# Patient Record
Sex: MALE | Race: WHITE | NOT HISPANIC OR LATINO | Employment: FULL TIME | URBAN - METROPOLITAN AREA
[De-identification: names, ages, dates, MRNs, and addresses within clinical notes are randomized per-mention and may not be internally consistent; named-entity substitution may affect disease eponyms.]

---

## 2019-11-22 ENCOUNTER — APPOINTMENT (OUTPATIENT)
Dept: RADIOLOGY | Facility: HOSPITAL | Age: 44
End: 2019-11-22
Payer: COMMERCIAL

## 2019-11-22 ENCOUNTER — TRANSCRIBE ORDERS (OUTPATIENT)
Dept: URGENT CARE | Facility: HOSPITAL | Age: 44
End: 2019-11-22

## 2019-11-22 DIAGNOSIS — M25.561 RIGHT KNEE PAIN, UNSPECIFIED CHRONICITY: Primary | ICD-10-CM

## 2019-11-22 DIAGNOSIS — M25.562 LEFT KNEE PAIN, UNSPECIFIED CHRONICITY: ICD-10-CM

## 2019-11-22 DIAGNOSIS — M25.561 RIGHT KNEE PAIN, UNSPECIFIED CHRONICITY: ICD-10-CM

## 2019-11-22 PROCEDURE — 73562 X-RAY EXAM OF KNEE 3: CPT

## 2020-01-07 ENCOUNTER — EVALUATION (OUTPATIENT)
Dept: PHYSICAL THERAPY | Facility: CLINIC | Age: 45
End: 2020-01-07
Payer: COMMERCIAL

## 2020-01-07 DIAGNOSIS — M22.2X9 DISORDER OF PATELLOFEMORAL JOINT, UNSPECIFIED LATERALITY: Primary | ICD-10-CM

## 2020-01-07 DIAGNOSIS — M25.561 CHRONIC PAIN OF RIGHT KNEE: ICD-10-CM

## 2020-01-07 DIAGNOSIS — M25.562 CHRONIC PAIN OF LEFT KNEE: ICD-10-CM

## 2020-01-07 DIAGNOSIS — G89.29 CHRONIC PAIN OF RIGHT KNEE: ICD-10-CM

## 2020-01-07 DIAGNOSIS — G89.29 CHRONIC PAIN OF LEFT KNEE: ICD-10-CM

## 2020-01-07 PROCEDURE — 97161 PT EVAL LOW COMPLEX 20 MIN: CPT | Performed by: PHYSICAL THERAPIST

## 2020-01-07 PROCEDURE — 97110 THERAPEUTIC EXERCISES: CPT | Performed by: PHYSICAL THERAPIST

## 2020-01-07 NOTE — LETTER
2020    Deana Londono DO  [address]  [address] [id number] [facility] [address] [facility]    Patient: Roni Sweeney   YOB: 1975   Date of Visit: 2020     Encounter Diagnosis     ICD-10-CM    1  Disorder of patellofemoral joint, unspecified laterality M22 2X9    2  Chronic pain of right knee M25 561     G89 29    3  Chronic pain of left knee M25 562     G89 29        Dear Dr Anupam Jay: Thank you for your recent referral of Roni Sweeney  Please review the attached evaluation summary from [facility] recent visit  Please verify that you agree with the plan of care by signing the attached order  If you have any questions or concerns, please do not hesitate to call  I sincerely appreciate the opportunity to share in the care of one of your patients and hope to have another opportunity to work with you in the near future  Sincerely,    Roxi Britton, PT      Referring Provider:      I certify that I have read the below Plan of Care and certify the need for these services furnished under this plan of treatment while under my care  Deanaelma Londono   [address]  [address] [facility] [place]: [phone]          PT Evaluation     Today's date: 2020  Patient name: Roni Sweeney  : 1975  MRN: [id number]  Referring provider: Nohemy Bone DO  Dx:   Encounter Diagnosis     ICD-10-CM    1  Disorder of patellofemoral joint, unspecified laterality M22 2X9    2  Chronic pain of right knee M25 561     G89 29    3  Chronic pain of left knee M25 562     G89 29        Start Time: 1300  Stop Time: 1400  Total time in clinic (min): 60 minutes    Assessment  Assessment details: Roni Sweeney was seen for an initial PT evaluation today  Patient is a 45 y o  male with diagnosis of bilateral knee pain and past medical history significant for hernias with repair, plantar fasciitis   Low complexity evaluation  due to number of participation restrictions, functional outcome measure of 50%% limitation, and stable clinical presentation  Findings today show right>left knee pain with weakness of hip extensors and abductors as well as poor patellar tracking on right with limitation in bilateral lower extremity flexibility likely contributing to pain at this time  Skilled PT indicated to treat at this time to address bilateral lower extremity strength, stability, and flexibility to decrease pain and improve overall functional mobility for return to prior level of function  Impairments: abnormal gait, activity intolerance, impaired balance, impaired physical strength, lacks appropriate home exercise program, pain with function, weight-bearing intolerance and poor body mechanics    Goals  STG (4 weeks)  1  Patient will have reported 0/10 pain in right knee at rest    2  Improve patient's right knee flexion to 132 degrees pain free for increased ability to take proper strides during ambulation  3  Increase patient's right single leg balance to 15 seconds for increased stability on stairs  LTG (8 weeks)  1  Patient's LE strength will be equal bilaterally for ability to ambulate and return to functional activities at [facility]  2  Patient will be able to walk 1 mile with 0/10 pain in right knee  3  Patient will be independent with home exercise program for continued maintenance post PT discharge         Plan  Plan details: Re-assessment in 4 weeks  Patient would benefit from: skilled physical therapy  Planned modality interventions: unattended electrical stimulation, thermotherapy: hydrocollator packs and cryotherapy  Planned therapy interventions: manual therapy, neuromuscular re-education, therapeutic activities, therapeutic exercise, home exercise program and gait training  Frequency: 2x week  Duration in weeks: 8  Plan of Care beginning date: 1/7/2020  Plan of Care expiration date: 3/7/2020  Treatment plan discussed with: PTA and patient        Subjective Evaluation    History of Present Illness  Date of onset: 9/7/2019  Mechanism of injury: Houston Anderson is a 45 y o  male who presents to outpatient Physical Therapy today with complaints of R>L knee pain  Patient states he has had chronic bilateral knee pain for years, but increased in September when he increased running mileage  Backed off of running, but had minimal relief  Saw ortho earlier today who reviewed x-rays, referred him to PT and Rx an antiinflammatory  NDV in 2 weeks  X-ray Impression: No osseus deformity bilaterally    Pain  Pain scale: R=6 L=3  Pain scale at lowest: R=3 L=0  Pain scale at highest: R=8 L=3  Location: medial aspect of bilateral knees  Quality: sharp and knife-like ("feels like a knee hernando that never goes away")  Relieving factors: rest and relaxation  Aggravating factors: running  Progression: no change    Social Support  Steps to enter house: yes  5  Stairs in house: yes   40  Lives in: multiple-level home    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  )    Diagnostic Tests  X-ray: normal  Patient Goals  Patient goal: Strengthen joint and be able to walk without pain        Objective     Observations   Left Knee   Negative for edema  Right Knee   Negative for edema  Tenderness   Left Knee   Tenderness in the medial joint line  No tenderness in the ITB, patellar tendon and tibial tubercle  Right Knee   Tenderness in the medial joint line and tibial tubercle  No tenderness in the ITB and patellar tendon  Neurological Testing     Sensation     Knee   Left Knee   Intact: light touch    Right Knee   Intact: light touch     Active Range of Motion   Left Knee   Flexion: 134 degrees   Extension: -4 degrees     Right Knee   Flexion: 130 degrees   Extension: 0 degrees     Mobility   Patellar Mobility:   Left Knee   WFL: medial, lateral, superior and inferior       Right Knee   WFL: medial, lateral, superior and inferior    Strength/Myotome Testing     Left Hip   Planes of Motion   Flexion: 5  Extension: 4  Abduction: 4    Right Hip   Planes of Motion   Flexion: 5  Extension: 4-  Abduction: 4-    Left Knee   Normal strength  Quadriceps contraction: good    Right Knee   Flexion: 4  Extension: 4  Quadriceps contraction: good    Tests     Additional Tests Details  Hamstring 90/90 SLR R= (31) L= (32)  Jsign (-) left, (+) right  R>L ITB tightness  Pain with right deep knee flexion    General Comments:      Knee Comments  Gait: Antalgic, decreased stance time on RLE, increased LLE supination with toe in  FOTO: 50% function, 68% predicted function         Flowsheet Rows      Most Recent Value   PT/OT G-Codes   Current Score  50   Projected Score  68             Precautions: none noted  Re-Evaluation:  2/7    Knee Specialty Daily Treatment Diary     Manual  1/7               Hamstring stretch        Quad stretch        ITB stretch                    Exercise Diary  1/7       Biodex Bike        bridges 10"x10       sidelying clamshell 30x       SLR all planes Hip flex with ER       SAQ Pain!        LAQ With ball squeeze 30x       Hamstring stretch 3x30" long sit       Calf stretch        Standing hamstring curls        TKE        Leg press Squat        SLB        Step up        [address] [facility]                                                    Modalities 1/7                                 Chase Adjutant was given a handout and verbal instruction of customized home exercise program  Patient accepted program and was able to demonstrate exercises

## 2020-01-07 NOTE — PROGRESS NOTES
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    2  Chronic pain of right knee M25 561     G89 29    3  Chronic pain of left knee M25 562     G89 29        Start Time: 1300  Stop Time: 1400  Total time in clinic (min): 60 minutes    Assessment  Assessment details: Houston Anderson was seen for an initial PT evaluation today  Patient is a 45 y o  male with diagnosis of bilateral knee pain and past medical history significant for hernias with repair, plantar fasciitis  Low complexity evaluation  due to number of participation restrictions, functional outcome measure of 50%% limitation, and stable clinical presentation  Findings today show right>left knee pain with weakness of hip extensors and abductors as well as poor patellar tracking on right with limitation in bilateral lower extremity flexibility likely contributing to pain at this time  Skilled PT indicated to treat at this time to address bilateral lower extremity strength, stability, and flexibility to decrease pain and improve overall functional mobility for return to prior level of function  Impairments: abnormal gait, activity intolerance, impaired balance, impaired physical strength, lacks appropriate home exercise program, pain with function, weight-bearing intolerance and poor body mechanics    Goals  STG (4 weeks)  1  Patient will have reported 0/10 pain in right knee at rest    2  Improve patient's right knee flexion to 132 degrees pain free for increased ability to take proper strides during ambulation  3  Increase patient's right single leg balance to 15 seconds for increased stability on stairs  LTG (8 weeks)  1  Patient's LE strength will be equal bilaterally for ability to ambulate and return to functional activities at [facility]     2  Patient will be able to walk 1 mile with 0/10 pain in right knee  3  Patient will be independent with home exercise program for continued maintenance post PT discharge  Plan  Plan details: Re-assessment in 4 weeks  Patient would benefit from: skilled physical therapy  Planned modality interventions: unattended electrical stimulation, thermotherapy: hydrocollator packs and cryotherapy  Planned therapy interventions: manual therapy, neuromuscular re-education, therapeutic activities, therapeutic exercise, home exercise program and gait training  Frequency: 2x week  Duration in weeks: 8  Plan of Care beginning date: 1/7/2020  Plan of Care expiration date: 3/7/2020  Treatment plan discussed with: PTA and patient        Subjective Evaluation    History of Present Illness  Date of onset: 9/7/2019  Mechanism of injury: Dahlia Astudillo is a 45 y o  male who presents to outpatient Physical Therapy today with complaints of R>L knee pain  Patient states he has had chronic bilateral knee pain for years, but increased in September when he increased running mileage  Backed off of running, but had minimal relief  Saw ortho earlier today who reviewed x-rays, referred him to PT and Rx an antiinflammatory  NDV in 2 weeks  X-ray Impression: No osseus deformity bilaterally    Pain  Pain scale: R=6 L=3  Pain scale at lowest: R=3 L=0  Pain scale at highest: R=8 L=3    Location: medial aspect of bilateral knees  Quality: sharp and knife-like ("feels like a knee hernando that never goes away")  Relieving factors: rest and relaxation  Aggravating factors: running  Progression: no change    Social Support  Steps to enter house: yes  5  Stairs in house: yes   40  Lives in: multiple-level home    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  )    Diagnostic Tests  X-ray: normal  Patient Goals  Patient goal: Strengthen joint and be able to walk without pain        Objective     Observations   Left Knee Negative for edema  Right Knee   Negative for edema  Tenderness   Left Knee   Tenderness in the medial joint line  No tenderness in the ITB, patellar tendon and tibial tubercle  Right Knee   Tenderness in the medial joint line and tibial tubercle  No tenderness in the ITB and patellar tendon  Neurological Testing     Sensation     Knee   Left Knee   Intact: light touch    Right Knee   Intact: light touch     Active Range of Motion   Left Knee   Flexion: 134 degrees   Extension: -4 degrees     Right Knee   Flexion: 130 degrees   Extension: 0 degrees     Mobility   Patellar Mobility:   Left Knee   WFL: medial, lateral, superior and inferior  Right Knee   WFL: medial, lateral, superior and inferior    Strength/Myotome Testing     Left Hip   Planes of Motion   Flexion: 5  Extension: 4  Abduction: 4    Right Hip   Planes of Motion   Flexion: 5  Extension: 4-  Abduction: 4-    Left Knee   Normal strength  Quadriceps contraction: good    Right Knee   Flexion: 4  Extension: 4  Quadriceps contraction: good    Tests     Additional Tests Details  Hamstring 90/90 SLR R= (31) L= (32)  Jsign (-) left, (+) right  R>L ITB tightness  Pain with right deep knee flexion    General Comments:      Knee Comments  Gait: Antalgic, decreased stance time on RLE, increased LLE supination with toe in  FOTO: 50% function, 68% predicted function         Flowsheet Rows      Most Recent Value   PT/OT G-Codes   Current Score  50   Projected Score  68             Precautions: none noted  Re-Evaluation:  2/7    Knee Specialty Daily Treatment Diary     Manual  1/7               Hamstring stretch        Quad stretch        ITB stretch                    Exercise Diary  1/7       Biodex Bike        bridges 10"x10       sidelying clamshell 30x       SLR all planes Hip flex with ER       SAQ Pain!        LAQ With ball squeeze 30x       Hamstring stretch 3x30" long sit       Calf stretch        Standing hamstring curls        TKE Leg press Squat        SLB        Step up        [facility]                                                    Modalities 1/7                                 Daphne Brizuela was given a handout and verbal instruction of customized home exercise program  Patient accepted program and was able to demonstrate exercises

## 2020-01-08 ENCOUNTER — TRANSCRIBE ORDERS (OUTPATIENT)
Dept: PHYSICAL THERAPY | Facility: CLINIC | Age: 45
End: 2020-01-08

## 2020-01-08 DIAGNOSIS — M22.2X9 DISORDER OF PATELLOFEMORAL JOINT, UNSPECIFIED LATERALITY: Primary | ICD-10-CM

## 2020-01-09 ENCOUNTER — OFFICE VISIT (OUTPATIENT)
Dept: PHYSICAL THERAPY | Facility: CLINIC | Age: 45
End: 2020-01-09
Payer: COMMERCIAL

## 2020-01-09 DIAGNOSIS — G89.29 CHRONIC PAIN OF RIGHT KNEE: ICD-10-CM

## 2020-01-09 DIAGNOSIS — G89.29 CHRONIC PAIN OF LEFT KNEE: ICD-10-CM

## 2020-01-09 DIAGNOSIS — M25.562 CHRONIC PAIN OF LEFT KNEE: ICD-10-CM

## 2020-01-09 DIAGNOSIS — M25.561 CHRONIC PAIN OF RIGHT KNEE: ICD-10-CM

## 2020-01-09 DIAGNOSIS — M22.2X9 DISORDER OF PATELLOFEMORAL JOINT, UNSPECIFIED LATERALITY: Primary | ICD-10-CM

## 2020-01-09 PROCEDURE — 97530 THERAPEUTIC ACTIVITIES: CPT | Performed by: PHYSICAL THERAPIST

## 2020-01-09 PROCEDURE — 97110 THERAPEUTIC EXERCISES: CPT | Performed by: PHYSICAL THERAPIST

## 2020-01-09 NOTE — PROGRESS NOTES
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    2  Chronic pain of right knee M25 561     G89 29    3  Chronic pain of left knee M25 562     G89 29        Start Time: 1700  Stop Time: 1755  Total time in clinic (min): 55 minutes    Subjective: Patient states his anti-inflammatory medication has been helping a lot  Not much pain in knees today  Objective: See treatment diary below      Assessment: Tolerated treatment well  Tends to overuse hip flexors to compensate for weakness in hip abductors  Noted tightness R>L ITB with tenderness during manual roller  Patient would benefit from continued PT      Plan: Continue per plan of care  Progress treatment as tolerated  Precautions: none noted; R>L knee pain  Re-Evaluation:      Knee Specialty Daily Treatment Diary     Manual                Hamstring stretch        Quad stretch        ITB stretch        ITB roller  Bilateral           Exercise Diary        Biodex Bike S=9  100/60 10 min       bridges 10"x10 10"x10      sidelying clamshell 30x 30x      Hip flex with ER  3x10      sidelying SLR  3x10      Prone hip ext; knee straight and bent  3x10 ea      SAQ Pain!        LAQ With ball squeeze 30x With ball squeeze 30x      Hamstring stretch 3x30" long sit 3x30" seated with stool      Calf stretch        Standing hamstring curls        TKE        Leg press Squat        SLB        Step up        [employer] board  15x ea      Mini Squat        [place]        Fire hydrant   30x                                          Modalities                                 Jaquelin Scott was given a handout and verbal instruction of customized home exercise program  Patient accepted program and was able to demonstrate exercises

## 2020-01-13 ENCOUNTER — OFFICE VISIT (OUTPATIENT)
Dept: PHYSICAL THERAPY | Facility: CLINIC | Age: 45
End: 2020-01-13
Payer: COMMERCIAL

## 2020-01-13 DIAGNOSIS — G89.29 CHRONIC PAIN OF LEFT KNEE: ICD-10-CM

## 2020-01-13 DIAGNOSIS — M25.562 CHRONIC PAIN OF LEFT KNEE: ICD-10-CM

## 2020-01-13 DIAGNOSIS — M25.561 CHRONIC PAIN OF RIGHT KNEE: ICD-10-CM

## 2020-01-13 DIAGNOSIS — M22.2X9 DISORDER OF PATELLOFEMORAL JOINT, UNSPECIFIED LATERALITY: Primary | ICD-10-CM

## 2020-01-13 DIAGNOSIS — G89.29 CHRONIC PAIN OF RIGHT KNEE: ICD-10-CM

## 2020-01-13 PROCEDURE — 97110 THERAPEUTIC EXERCISES: CPT | Performed by: PHYSICAL THERAPIST

## 2020-01-13 PROCEDURE — 97530 THERAPEUTIC ACTIVITIES: CPT | Performed by: PHYSICAL THERAPIST

## 2020-01-13 NOTE — PROGRESS NOTES
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    2  Chronic pain of right knee M25 561     G89 29    3  Chronic pain of left knee M25 562     G89 29        Start Time: 1700  Stop Time: 1755  Total time in clinic (min): 55 minutes    Subjective: Patient states 2/10 pain in right knee today  Wearing sleeve on right knee which seems to help  Objective: See treatment diary below      Assessment: Tolerated treatment well  Most pain during full knee extension in WB  Patient would benefit from continued PT      Plan: Continue per plan of care  Progress treatment as tolerated  Precautions: none noted; R>L knee pain  Re-Evaluation:      Knee Specialty Daily Treatment Diary     Manual               Hamstring stretch        Quad stretch        ITB stretch        ITB roller  Bilateral  bilateral         Exercise Diary       Biodex Bike S=9  100/60 10 min  elliptical     Elliptical   L1 10 min     bridges 10"x10 10"x10 10"x10     sidelying clamshell 30x 30x 30x bilat     Hip flex with ER  3x10 3x10     sidelying SLR  3x10 3x10     Prone hip ext; knee straight and bent  3x10 ea 3x10 ea     SAQ Pain!        LAQ With ball squeeze 30x With ball squeeze 30x With ball squeeze 30x     Hamstring stretch 3x30" long sit 3x30" seated with stool 3x30" seated with stool     Calf stretch        Standing hamstring curls        TKE        Leg press Squat   With ball squeeze S=6 65# 3x10     SLB        Step up        Fitter board  15x ea 15x ea with stops     Mini Squat        [place]        Fire hydrant   30x 30x                                         Modalities

## 2020-01-16 ENCOUNTER — OFFICE VISIT (OUTPATIENT)
Dept: PHYSICAL THERAPY | Facility: CLINIC | Age: 45
End: 2020-01-16
Payer: COMMERCIAL

## 2020-01-16 DIAGNOSIS — G89.29 CHRONIC PAIN OF RIGHT KNEE: ICD-10-CM

## 2020-01-16 DIAGNOSIS — M25.562 CHRONIC PAIN OF LEFT KNEE: ICD-10-CM

## 2020-01-16 DIAGNOSIS — M25.561 CHRONIC PAIN OF RIGHT KNEE: ICD-10-CM

## 2020-01-16 DIAGNOSIS — G89.29 CHRONIC PAIN OF LEFT KNEE: ICD-10-CM

## 2020-01-16 DIAGNOSIS — M22.2X9 DISORDER OF PATELLOFEMORAL JOINT, UNSPECIFIED LATERALITY: Primary | ICD-10-CM

## 2020-01-16 PROCEDURE — 97530 THERAPEUTIC ACTIVITIES: CPT | Performed by: PHYSICAL THERAPIST

## 2020-01-16 PROCEDURE — 97110 THERAPEUTIC EXERCISES: CPT | Performed by: PHYSICAL THERAPIST

## 2020-01-16 NOTE — PROGRESS NOTES
Daily Note     Today's date: 2020  Patient name: Carmel Fox  : 1975  MRN: [id number]  Referring provider: Angeles Barcenas DO  Dx:   Encounter Diagnosis     ICD-10-CM    1  Disorder of patellofemoral joint, unspecified laterality M22 2X9    2  Chronic pain of right knee M25 561     G89 29    3  Chronic pain of left knee M25 562     G89 29        Start Time: 1700  Stop Time: 1745  Total time in clinic (min): 45 minutes    Subjective: Patient states today is a good day, doesn't have much pain  PQ 2/10  Objective: See treatment diary below      Assessment: Tolerated treatment well  Noted weakness of core with difficulty maintaining proper positioning during clamshell exercise  Patient would benefit from continued PT      Plan: Continue per plan of care  Progress treatment as tolerated  Add exercises per flowsheet  Precautions: none noted; R>L knee pain  Re-Evaluation:      Knee Specialty Daily Treatment Diary     Manual              Hamstring stretch        Quad stretch        ITB stretch        ITB roller  Bilateral  bilateral Foam roll        Exercise Diary      Biodex Bike S=9  100/60 10 min  elliptical elliptical    Elliptical   L1 10 min L1 10 min    bridges 10"x10 10"x10 10"x10     sidelying clamshell 30x 30x 30x bilat Red 30x bilat    Hip flex with ER  3x10 3x10 3x10    sidelying SLR  3x10 3x10 3x10    Prone hip ext; knee straight and bent  3x10 ea 3x10 ea 3x10ea    SAQ Pain!        LAQ With ball squeeze 30x With ball squeeze 30x With ball squeeze 30x With ball squeeze 30x    Hamstring stretch 3x30" long sit 3x30" seated with stool 3x30" seated with stool 3x30" seated with stool    Calf stretch        Standing hamstring curls        TKE        Leg press Squat   With ball squeeze S=6 65# 3x10 With ball squeeze S=6 75# 3x10    SLB        Step up        Fitter board  15x ea 15x ea with stops 20x ea with stops    [employer] Fire hydrant   30x 30x 30x    Core brace                                    Modalities 1/7 1/9 1/13 1/16

## 2020-01-20 ENCOUNTER — OFFICE VISIT (OUTPATIENT)
Dept: PHYSICAL THERAPY | Facility: CLINIC | Age: 45
End: 2020-01-20
Payer: COMMERCIAL

## 2020-01-20 DIAGNOSIS — G89.29 CHRONIC PAIN OF RIGHT KNEE: ICD-10-CM

## 2020-01-20 DIAGNOSIS — M25.561 CHRONIC PAIN OF RIGHT KNEE: ICD-10-CM

## 2020-01-20 DIAGNOSIS — M25.562 CHRONIC PAIN OF LEFT KNEE: ICD-10-CM

## 2020-01-20 DIAGNOSIS — G89.29 CHRONIC PAIN OF LEFT KNEE: ICD-10-CM

## 2020-01-20 DIAGNOSIS — M22.2X9 DISORDER OF PATELLOFEMORAL JOINT, UNSPECIFIED LATERALITY: Primary | ICD-10-CM

## 2020-01-20 PROCEDURE — 97530 THERAPEUTIC ACTIVITIES: CPT | Performed by: PHYSICAL THERAPIST

## 2020-01-20 PROCEDURE — 97110 THERAPEUTIC EXERCISES: CPT | Performed by: PHYSICAL THERAPIST

## 2020-01-20 NOTE — PROGRESS NOTES
Daily Note     Today's date: 2020  Patient name: Tarun Arndt  : 1975  MRN: [id number]  Referring provider: Werner Holguin DO  Dx:   Encounter Diagnosis     ICD-10-CM    1  Disorder of patellofemoral joint, unspecified laterality M22 2X9    2  Chronic pain of right knee M25 561     G89 29    3  Chronic pain of left knee M25 562     G89 29        Start Time: 1700  Stop Time: 1755  Total time in clinic (min): 55 minutes    Subjective: Patient states increased knee pain the last few days after shoveling snow  Objective: See treatment diary below      Assessment: Tolerated treatment well  Most c/o knee pain at [facility] indicating likely patellofemoral pain  Patient would benefit from continued PT  Plan: Continue per plan of care  Progress treatment as tolerated  Precautions: none noted; R>L knee pain  Re-Evaluation:      Knee Specialty Daily Treatment Diary     Manual             Hamstring stretch        Quad stretch        ITB stretch        ITB roller  Bilateral  bilateral Foam roll        Exercise Diary     Biodex Bike S=9  100/60 10 min  elliptical elliptical elliptical   Elliptical   L1 10 min L1 10 min L1 10 min   bridges 10"x10 10"x10 10"x10  10"x10   sidelying clamshell 30x 30x 30x bilat Red 30x bilat Red 30x bilat   Hip flex with ER  3x10 3x10 3x10 3x10   sidelying SLR  3x10 3x10 3x10 3x10   Prone hip ext; knee straight and bent  3x10 ea 3x10 ea 3x10ea 3x10ea   SAQ Pain!        LAQ With ball squeeze 30x With ball squeeze 30x With ball squeeze 30x With ball squeeze 30x With ball squeeze 30x   Hamstring stretch 3x30" long sit 3x30" seated with stool 3x30" seated with stool 3x30" seated with stool 3x30" seated with stool   Calf stretch        Standing hamstring curls        TKE     Blue 15x bilat   Leg press Squat   With ball squeeze S=6 65# 3x10 With ball squeeze S=6 75# 3x10 With ball squeeze S=6 75# 3x10   SLB        Step up Fitter board  15x ea 15x ea with stops 20x ea with stops 25x ea with stops   [employer]        Fire hydrant   30x 30x 30x 30x   Core brace                                    Modalities 1/7 1/9 1/13 1/16

## 2020-01-23 ENCOUNTER — OFFICE VISIT (OUTPATIENT)
Dept: PHYSICAL THERAPY | Facility: CLINIC | Age: 45
End: 2020-01-23
Payer: COMMERCIAL

## 2020-01-23 DIAGNOSIS — M25.562 CHRONIC PAIN OF LEFT KNEE: ICD-10-CM

## 2020-01-23 DIAGNOSIS — M22.2X9 DISORDER OF PATELLOFEMORAL JOINT, UNSPECIFIED LATERALITY: Primary | ICD-10-CM

## 2020-01-23 DIAGNOSIS — G89.29 CHRONIC PAIN OF RIGHT KNEE: ICD-10-CM

## 2020-01-23 DIAGNOSIS — G89.29 CHRONIC PAIN OF LEFT KNEE: ICD-10-CM

## 2020-01-23 DIAGNOSIS — M25.561 CHRONIC PAIN OF RIGHT KNEE: ICD-10-CM

## 2020-01-23 PROCEDURE — 97110 THERAPEUTIC EXERCISES: CPT | Performed by: PHYSICAL THERAPIST

## 2020-01-23 PROCEDURE — 97530 THERAPEUTIC ACTIVITIES: CPT | Performed by: PHYSICAL THERAPIST

## 2020-01-23 NOTE — PROGRESS NOTES
Daily Note     Today's date: 2020  Patient name: Natalya Aguilar  : 1975  MRN: [id number]  Referring provider: Jorge L Candelaria DO  Dx:   Encounter Diagnosis     ICD-10-CM    1  Disorder of patellofemoral joint, unspecified laterality M22 2X9    2  Chronic pain of right knee M25 561     G89 29    3  Chronic pain of left knee M25 562     G89 29        Start Time: 1700  Stop Time: 1800  Total time in clinic (min): 60 minutes    Subjective: Patient states he took medicine before coming to treatment today so knees are feeling pretty good  3/10 pain level  Objective: See treatment diary below      Assessment: Tolerated treatment well  Increased tenderness at right ITB when compared bilaterally  Patient would benefit from continued PT      Plan: Continue per plan of care  Progress treatment as tolerated         Precautions: none noted; R>L knee pain  Re-Evaluation:      Knee Specialty Daily Treatment Diary     Manual             Hamstring stretch        Quad stretch        ITB stretch        ITB roller Foam roller  bilateral Foam roll        Exercise Diary     Biodex Bike S=9 100/50 10 min  elliptical elliptical elliptical   Elliptical np  L1 10 min L1 10 min L1 10 min   bridges 10"x10  10"x10  10"x10   sidelying clamshell Green bilat 30x  30x bilat Red 30x bilat Red 30x bilat   Hip flex with ER 1# 3x10  3x10 3x10 3x10   sidelying SLR 1# 3x10  3x10 3x10 3x10   Prone hip ext; knee straight and bent 1# 3x10ea bilat  3x10 ea 3x10ea 3x10ea   SAQ        LAQ With ball squeeze 30x  With ball squeeze 30x With ball squeeze 30x With ball squeeze 30x   Hamstring stretch 3x30" seated bilat  3x30" seated with stool 3x30" seated with stool 3x30" seated with stool   Calf stretch        Standing hamstring curls        TKE Blue 15x bilat    Blue 15x bilat   Leg press Squat With ball squeeze   And TB S=6 75# 3x10ea  With ball squeeze S=6 65# 3x10 With ball squeeze S=6 75# 3x10 With ball squeeze S=6 75# 3x10   SLB        Step up        Fitter board 30x ea with stops  15x ea with stops 20x ea with stops 25x ea with stops   Mini Squat        [place]       Fire hydrant  30x  30x 30x 30x   Core brace                                    Modalities 1/23 1/13 1/16

## 2020-01-27 ENCOUNTER — OFFICE VISIT (OUTPATIENT)
Dept: PHYSICAL THERAPY | Facility: CLINIC | Age: 45
End: 2020-01-27
Payer: COMMERCIAL

## 2020-01-27 DIAGNOSIS — G89.29 CHRONIC PAIN OF LEFT KNEE: ICD-10-CM

## 2020-01-27 DIAGNOSIS — M25.561 CHRONIC PAIN OF RIGHT KNEE: ICD-10-CM

## 2020-01-27 DIAGNOSIS — M22.2X9 DISORDER OF PATELLOFEMORAL JOINT, UNSPECIFIED LATERALITY: Primary | ICD-10-CM

## 2020-01-27 DIAGNOSIS — M25.562 CHRONIC PAIN OF LEFT KNEE: ICD-10-CM

## 2020-01-27 DIAGNOSIS — G89.29 CHRONIC PAIN OF RIGHT KNEE: ICD-10-CM

## 2020-01-27 PROCEDURE — 97110 THERAPEUTIC EXERCISES: CPT

## 2020-01-27 NOTE — PROGRESS NOTES
Daily Note     Today's date: 2020  Patient name: Nieves Concepcion  : 1975  MRN: [id number]  Referring provider: Reji Rajput DO  Dx:   Encounter Diagnosis     ICD-10-CM    1  Disorder of patellofemoral joint, unspecified laterality M22 2X9    2  Chronic pain of right knee M25 561     G89 29    3  Chronic pain of left knee M25 562     G89 29        Start Time: 1702  Stop Time: 1807  Total time in clinic (min): 65 minutes    Subjective: Patient states his pain is a 3-4/10 in both medial knees  He is now only taking his pain medication 2 hours before therapy  Objective: See treatment diary below    Patient's home exercise program updated to include additional exercises  Handout declined  Assessment: Tolerated treatment well  Patient would benefit from continued PT for stretching and strengthening  He was able to increase and add exercises without difficulty  He had a little knee discomfort with certain knee exercises at end range  Patient instructed to work in pain free range only  Verbal cues need with some exercises for proper performance of exercises  His pain levels was the same when leaving department  Plan: Continue per plan of care  Progress treatment as tolerated         Precautions: none noted; R>L knee pain  Re-Evaluation:      Knee Specialty Daily Treatment Diary     Manual             Hamstring stretch        Quad stretch        ITB stretch        ITB roller Foam roller declined  Foam roll        Exercise Diary     Biodex Bike S=9 100/50 10 min Elliptical   elliptical elliptical   Elliptical np L2  x10min  L1 10 min L1 10 min   bridges 10"x10 :10x10   10"x10   sidelying clamshell Green bilat 30x Green B/L x30  Red 30x bilat Red 30x bilat   Hip flex with ER 1# 3x10 1# 3x10  3x10 3x10   sidelying SLR 1# 3x10 1# 3x10  3x10 3x10   Prone hip ext; knee straight and bent 1# 3x10ea bilat 1# 3x10 ea B/L   3x10ea 3x10ea   SAQ        LAQ With ball squeeze 30x With ball squeeze 30x  With ball squeeze 30x With ball squeeze 30x   Hamstring stretch 3x30" seated bilat 3x30" seated bilat  3x30" seated with stool 3x30" seated with stool   Calf stretch        Standing hamstring curls        TKE Blue 15x bilat Blue 15x bilat   Blue 15x bilat   Leg press Squat With ball squeeze   And TB S=6 75# 3x10ea With ball squeeze   And TB S=6 75# 3x10ea  With ball squeeze S=6 75# 3x10 With ball squeeze S=6 75# 3x10   SLB        Step up        Fitter board 30x ea with stops 30x ea with stops  20x ea with stops 25x ea with stops   [employer] nv x10ea      Fire hydrant  30x x30  30x 30x   Core brace                                    Modalities 1/23 1/16

## 2020-01-30 ENCOUNTER — OFFICE VISIT (OUTPATIENT)
Dept: PHYSICAL THERAPY | Facility: CLINIC | Age: 45
End: 2020-01-30
Payer: COMMERCIAL

## 2020-01-30 DIAGNOSIS — G89.29 CHRONIC PAIN OF RIGHT KNEE: ICD-10-CM

## 2020-01-30 DIAGNOSIS — G89.29 CHRONIC PAIN OF LEFT KNEE: ICD-10-CM

## 2020-01-30 DIAGNOSIS — M25.561 CHRONIC PAIN OF RIGHT KNEE: ICD-10-CM

## 2020-01-30 DIAGNOSIS — M22.2X9 DISORDER OF PATELLOFEMORAL JOINT, UNSPECIFIED LATERALITY: Primary | ICD-10-CM

## 2020-01-30 DIAGNOSIS — M25.562 CHRONIC PAIN OF LEFT KNEE: ICD-10-CM

## 2020-01-30 PROCEDURE — 97110 THERAPEUTIC EXERCISES: CPT | Performed by: PHYSICAL THERAPIST

## 2020-01-30 PROCEDURE — 97530 THERAPEUTIC ACTIVITIES: CPT | Performed by: PHYSICAL THERAPIST

## 2020-01-30 PROCEDURE — 97112 NEUROMUSCULAR REEDUCATION: CPT | Performed by: PHYSICAL THERAPIST

## 2020-01-30 NOTE — PROGRESS NOTES
Daily Note     Today's date: 2020  Patient name: Balbina Wild  : 1975  MRN: [id number]  Referring provider: Kaye Melton DO  Dx:   Encounter Diagnosis     ICD-10-CM    1  Disorder of patellofemoral joint, unspecified laterality M22 2X9    2  Chronic pain of right knee M25 561     G89 29    3  Chronic pain of left knee M25 562     G89 29        Start Time: 1710  Stop Time: 1805  Total time in clinic (min): 55 minutes    Subjective: Patient states increased pain today 4/10, "just a bad day"  Did fall yesterday after tripping  Felt good earlier this week and decided to walk to work ( 4 mi) causing increased knee pain  Objective: See treatment diary below      Assessment: Tolerated treatment well  No difficulty with increased exercise intensity noted per chart, may continue to increase # next session as able  Patient exhibited good technique with therapeutic exercises and would benefit from continued PT      Plan: Continue per plan of care  Progress treatment as tolerated         Precautions: none noted; R>L knee pain  Re-Evaluation:  2/    Knee Specialty Daily Treatment Diary     Manual             Hamstring stretch        Quad stretch        ITB stretch        ITB roller Foam roller declined Foam roller         Exercise Diary     Biodex Bike S=9 100/50 10 min Elliptical  100/50 10 min  elliptical   Elliptical np L2  x10min bike  L1 10 min   bridges 10"x10 :10x10 10"x10  10"x10   sidelying clamshell Green bilat 30x Green B/L x30 Green B/L x30  Red 30x bilat   Hip flex with ER 1# 3x10 1# 3x10 1# 3x10 bilat 2# 3x10   sidelying SLR 1# 3x10 1# 3x10 1# 3x10 bilat 2# 3x10   Prone hip ext; knee straight and bent 1# 3x10ea bilat 1# 3x10 ea B/L  1# 3x10 ea B/L 2# 3x10ea   SAQ        LAQ With ball squeeze 30x With ball squeeze 30x With ball squeeze 30x 1# With ball squeeze 30x   Hamstring stretch 3x30" seated bilat 3x30" seated bilat 3x30" seated bilat  3x30" seated with stool   Calf stretch        Standing hamstring curls        TKE Blue 15x bilat Blue 15x bilat Blue 30x bilat  Blue 15x bilat   Leg press Squat With ball squeeze   And TB S=6 75# 3x10ea With ball squeeze   And TB S=6 75# 3x10ea With ball squeeze   And TB S=6 75# 3x10ea  With ball squeeze S=6 75# 3x10   SLB        Step up        Fitter board 30x ea with stops 30x ea with stops 30xfwd/ retro with stops  25x ea with stops   Mini Squat        [place] nv x10ea x10ea bilat     Fire hydrant  30x x30 Green TB 3x10 bilat  30x   Core brace                                    Modalities 1/23 1/16

## 2020-02-03 ENCOUNTER — OFFICE VISIT (OUTPATIENT)
Dept: PHYSICAL THERAPY | Facility: CLINIC | Age: 45
End: 2020-02-03
Payer: COMMERCIAL

## 2020-02-03 DIAGNOSIS — G89.29 CHRONIC PAIN OF LEFT KNEE: ICD-10-CM

## 2020-02-03 DIAGNOSIS — M25.562 CHRONIC PAIN OF LEFT KNEE: ICD-10-CM

## 2020-02-03 DIAGNOSIS — M22.2X9 DISORDER OF PATELLOFEMORAL JOINT, UNSPECIFIED LATERALITY: Primary | ICD-10-CM

## 2020-02-03 DIAGNOSIS — M25.561 CHRONIC PAIN OF RIGHT KNEE: ICD-10-CM

## 2020-02-03 DIAGNOSIS — G89.29 CHRONIC PAIN OF RIGHT KNEE: ICD-10-CM

## 2020-02-03 PROCEDURE — 97110 THERAPEUTIC EXERCISES: CPT | Performed by: PHYSICAL THERAPIST

## 2020-02-03 PROCEDURE — 97112 NEUROMUSCULAR REEDUCATION: CPT | Performed by: PHYSICAL THERAPIST

## 2020-02-03 PROCEDURE — 97530 THERAPEUTIC ACTIVITIES: CPT | Performed by: PHYSICAL THERAPIST

## 2020-02-03 NOTE — PROGRESS NOTES
Daily Note     Today's date: 2/3/2020  Patient name: Carmel Fox  : 1975  MRN: [id number]  Referring provider: Angeles Barcenas DO  Dx:   Encounter Diagnosis     ICD-10-CM    1  Disorder of patellofemoral joint, unspecified laterality M22 2X9    2  Chronic pain of right knee M25 561     G89 29    3  Chronic pain of left knee M25 562     G89 29        Start Time: 2190  Stop Time: 1800  Total time in clinic (min): 55 minutes    Subjective: Patient states he feels improvement in left knee, no significant change with right knee  PQ in right knee 4/10 on medication  States he did not do much yesterday, sat around a lot of the day  Objective: See treatment diary below      Assessment: Tolerated treatment well  Able to increase exercise intensity (#) without difficulty  Patient would benefit from continued PT      Plan: Continue per plan of care  Progress treatment as tolerated         Precautions: none noted; R>L knee pain  Re-Evaluation:      Knee Specialty Daily Treatment Diary     Manual             Hamstring stretch        Quad stretch        ITB stretch        ITB roller Foam roller declined Foam roller         Exercise Diary  1/23 1/27 1/30 2/3 1/20   Biodex Bike S=9 100/50 10 min Elliptical  100/50 10 min Elliptical elliptical   Elliptical np L2  x10min bike L2 10 min L1 10 min   bridges 10"x10 :10x10 10"x10 10"x10 10"x10   sidelying clamshell Green bilat 30x Green B/L x30 Green B/L x30 Green B/L x30 Red 30x bilat   Hip flex with ER 1# 3x10 1# 3x10 1# 3x10 bilat 2#3x10 B/L 3x10   sidelying SLR 1# 3x10 1# 3x10 1# 3x10 bilat 2#3x10 B/L 3x10   Prone hip ext; knee straight and bent 1# 3x10ea bilat 1# 3x10 ea B/L  1# 3x10 ea B/L 2#3x10 ea B/L 3x10ea   SAQ        LAQ With ball squeeze 30x With ball squeeze 30x With ball squeeze 30x 1# With ball squeeze 30x With ball squeeze 30x   Hamstring stretch 3x30" seated bilat 3x30" seated bilat 3x30" seated bilat 3x30" seated bilat 3x30" seated with stool   Calf stretch        Standing hamstring curls        TKE Blue 15x bilat Blue 15x bilat Blue 30x bilat Blue 30x bilat Blue 15x bilat   Leg press Squat With ball squeeze   And TB S=6 75# 3x10ea With ball squeeze   And TB S=6 75# 3x10ea With ball squeeze   And TB S=6 75# 3x10ea With ball squeeze   And TB S=6 75# 3x10ea With ball squeeze S=6 75# 3x10   SLB        Step up        Fitter board 30x ea with stops 30x ea with stops 30xfwd/ retro with stops 30xfwd/ retro with stops 25x ea with stops   Mini Squat        [place] nv x10ea x10ea bilat x10ea bilat    Fire hydrant  30x x30 Green TB 3x10 bilat Green TB 3x10 bilat 30x   Core brace                                    Modalities 1/23

## 2020-02-06 ENCOUNTER — EVALUATION (OUTPATIENT)
Dept: PHYSICAL THERAPY | Facility: CLINIC | Age: 45
End: 2020-02-06
Payer: COMMERCIAL

## 2020-02-06 DIAGNOSIS — G89.29 CHRONIC PAIN OF RIGHT KNEE: ICD-10-CM

## 2020-02-06 DIAGNOSIS — M25.561 CHRONIC PAIN OF RIGHT KNEE: ICD-10-CM

## 2020-02-06 DIAGNOSIS — M22.2X9 DISORDER OF PATELLOFEMORAL JOINT, UNSPECIFIED LATERALITY: Primary | ICD-10-CM

## 2020-02-06 DIAGNOSIS — G89.29 CHRONIC PAIN OF LEFT KNEE: ICD-10-CM

## 2020-02-06 DIAGNOSIS — M25.562 CHRONIC PAIN OF LEFT KNEE: ICD-10-CM

## 2020-02-06 PROCEDURE — 97110 THERAPEUTIC EXERCISES: CPT | Performed by: PHYSICAL THERAPIST

## 2020-02-06 PROCEDURE — 97530 THERAPEUTIC ACTIVITIES: CPT | Performed by: PHYSICAL THERAPIST

## 2020-02-06 NOTE — LETTER
2020    Nora BestDO tru  [address] [address] [facility] [address]     Patient: Bayron Krishnan   YOB: 1975   Date of Visit: 2020     Encounter Diagnosis     ICD-10-CM    1  Disorder of patellofemoral joint, unspecified laterality M22 2X9    2  Chronic pain of right knee M25 561     G89 29    3  Chronic pain of left knee M25 562     G89 29        Dear Dr Laura Capone: Thank you for your recent referral of Bayron Krishnan  Please review the attached evaluation summary from [facility] recent visit  Please verify that you agree with the plan of care by signing the attached order  If you have any questions or concerns, please do not hesitate to call  I sincerely appreciate the opportunity to share in the care of one of your patients and hope to have another opportunity to work with you in the near future  Sincerely,    Army Cook PT      Referring Provider:      I certify that I have read the below Plan of Care and certify the need for these services furnished under this plan of treatment while under my care  Noraminerva Diaz DO  [address] [id number] [facility]  96 : [phone]          PT Re-Evaluation     Today's date: 2020  Patient name: Bayron Krishnan  : 1975  MRN: [id number]  Referring provider: Monik Saunders DO  Dx:   Encounter Diagnosis     ICD-10-CM    1  Disorder of patellofemoral joint, unspecified laterality M22 2X9    2  Chronic pain of right knee M25 561     G89 29    3  Chronic pain of left knee M25 562     G89 29        Start Time: 1784  Stop Time: 1800  Total time in clinic (min): 55 minutes    Assessment/Plan   Assessment details: Bayron Krishnan has been seen in outpatient PT for 10 sessions beginning 2020  Patient is a 45 y o  male with diagnosis of bilateral knee pain and past medical history significant for hernias with repair, plantar fasciitis   Findings today show improvement in left and right knee pain symptoms, patient reports minimal progress with right knee function  Did note significant weakness of right hip abductors and extensors with tightness of hamstrings when compared bilaterally likely contributing to symptoms  Continuation of skilled PT indicated to work on hip and core strength as well as right lower extremity to improve overall functional stability and decrease stress through right knee  Impairments: abnormal gait, activity intolerance, impaired balance, impaired physical strength, lacks appropriate home exercise program, pain with function, weight-bearing intolerance and poor body mechanics    Goals  STG (4 weeks)  1  Patient will have reported 0/10 pain in right knee at rest  - not met  2  Improve patient's right knee flexion to 132 degrees pain free for increased ability to take proper strides during ambulation  - MET  3  Increase patient's right single leg balance to 15 seconds for increased stability on stairs  - MET  LTG (8 weeks)  1  Patient's LE strength will be equal bilaterally for ability to ambulate and return to functional activities at PLOF  - progressing  2  Patient will be able to walk 1 mile with 0/10 pain in right knee  - not met  3  Patient will be independent with home exercise program for continued maintenance post PT discharge         Plan  Plan details: Re-assessment in 4 weeks  Patient would benefit from: skilled physical therapy  Planned modality interventions: unattended electrical stimulation, thermotherapy: hydrocollator packs and cryotherapy  Planned therapy interventions: manual therapy, neuromuscular re-education, therapeutic activities, therapeutic exercise, home exercise program and gait training  Frequency: 2x week  Duration in weeks: 8  Plan of Care beginning date: 1/7/2020  Plan of Care expiration date: 3/7/2020  Treatment plan discussed with: PTA and patient      Subjective   History of Present Illness  Date of onset: 9/7/2019  Subjective 2/6/20: Patient states 90% improvement in left knee pain since the start of PT  Does not feel any significant change on the right side  Continues to have pain with transfers, descending stairs, walking >1 minute  Pain in right knee located over patella and medial aspect of right knee  F/u with referring provider 2/18/20  Mechanism of injury: Natalya Aguilar is a 45 y o  male who presents to outpatient Physical Therapy today with complaints of R>L knee pain  Patient states he has had chronic bilateral knee pain for years, but increased in September when he increased running mileage  Backed off of running, but had minimal relief  Saw ortho earlier today who reviewed x-rays, referred him to PT and Rx an antiinflammatory  NDV in 2 weeks  X-ray Impression: No osseus deformity bilaterally    Pain      2/6  Pain scale: R=6 L=3     R= 4 L= 1  Pain scale at lowest: R=3 L=0   R= 3 L= 0  Pain scale at highest: R=8 L=3  R= 6 L= 3  Location: medial aspect of bilateral knees  Quality: sharp and knife-like ("feels like a knee hernando that never goes away")  Relieving factors: rest and relaxation  Aggravating factors: running  Progression: no change    Social Support  Steps to enter house: yes  5  Stairs in house: yes   40  Lives in: multiple-level home    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  )    Diagnostic Tests  X-ray: normal  Patient Goals  Patient goal: Strengthen joint and be able to walk without pain    Objective   Observations   Left Knee   Negative for edema  Right Knee   Negative for edema  Tenderness   Left Knee   Tenderness in the medial joint line  No tenderness in the ITB, patellar tendon and tibial tubercle  Right Knee   Tenderness in the medial joint line, pes anserine, and tibial tubercle  No tenderness in the ITB and patellar tendon       Neurological Testing     Sensation     Knee   Left Knee   Intact: light touch    Right Knee   Intact: light touch     Active Range of Motion  2/6  Left Knee   Flexion: 134 degrees   Extension: -4 degrees  6 degrees     Right Knee   Flexion: 130 degrees   133 degrees   Extension: 0 degrees     Mobility   Patellar Mobility:   Left Knee   WFL: medial, lateral, superior and inferior       Right Knee   WFL: medial, lateral, superior and inferior    Strength/Myotome Testing  2/6    Left Hip   Planes of Motion   Flexion: 5    5  Extension: 4    4  Abduction: 4    4+    Right Hip   Planes of Motion   Flexion: 5    5  Extension: 4-    4  Abduction: 4-    4-    Left Knee   Normal strength   WNL  Quadriceps contraction: good    Right Knee   Flexion: 4    4+ pain free  Extension: 4    4+ pain free  Quadriceps contraction: good    Tests       2/6    Additional Tests Details  Hamstring 90/90 SLR R= (31) L= (32) R= (33) L=(24)  Jsign (-) left, (+) right    (-) right   R>L ITB tightness    (-) left, (+) right   Pain with right deep knee flexion  No change  SLB EO     R= 30 sec L= 25 sec  SLB EC     R= 4 sec L=8 sec    General Comments:      Knee Comments  Gait: Antalgic, decreased stance time on RLE, increased LLE supination with toe in  FOTO: 42% was 50% function, 68% predicted function            Precautions: none noted; R>L knee pain  Re-Evaluation:  3/6  POC: 3/7    Knee Specialty Daily Treatment Diary     Manual  1/23 1/27 1/30  2/6           Hamstring stretch        Quad stretch        ITB stretch        ITB roller Foam roller declined Foam roller  Right only       Exercise Diary  1/23 1/27 1/30 2/3 2/6   Biodex Bike S=9 100/50 10 min Elliptical  100/50 10 min Elliptical elipitical   Elliptical np L2  x10min bike L2 10 min L2 10 min   bridges 10"x10 :10x10 10"x10 10"x10 10"x10   sidelying clamshell Green bilat 30x Green B/L x30 Green B/L x30 Green B/L x30 Green B/L x30   Hip flex with ER 1# 3x10 1# 3x10 1# 3x10 bilat 2#3x10 B/L prn   sidelying SLR 1# 3x10 1# 3x10 1# 3x10 bilat 2#3x10 B/L Against wall next visit   Prone hip ext; knee straight and bent 1# 3x10ea bilat 1# 3x10 ea B/L  1# 3x10 ea B/L 2#3x10 ea B/L Next visit   SAQ        LAQ With ball squeeze 30x With ball squeeze 30x With ball squeeze 30x 1# With ball squeeze 30x Next visit   Hamstring stretch 3x30" seated bilat 3x30" seated bilat 3x30" seated bilat 3x30" seated bilat 3x30" long sit right   Calf stretch        Standing hamstring curls        TKE Blue 15x bilat Blue 15x bilat Blue 30x bilat Blue 30x bilat Blue 30x bilat   Leg press Squat With ball squeeze   And TB S=6 75# 3x10ea With ball squeeze   And TB S=6 75# 3x10ea With ball squeeze   And TB S=6 75# 3x10ea With ball squeeze   And TB S=6 75# 3x10ea With ball squeeze   And TB S=6 75# 3x10ea   SLB     airex   Step up        Fitter board 30x ea with stops 30x ea with stops 30xfwd/ retro with stops 30xfwd/ retro with stops DC   Mini Squat        [place] nv x10ea x10ea bilat x10ea bilat With TB next visit   Fire hydrant  30x x30 Green TB 3x10 bilat Green TB 3x10 bilat Green TB 3x10 bilat   Core brace        Bird dog     Next visit                       Modalities 1/23

## 2020-02-06 NOTE — PROGRESS NOTES
PT Re-Evaluation     Today's date: 2020  Patient name: Houston Anderson  : 1975  MRN: [id number]  Referring provider: Yanira Zavala DO  Dx:   Encounter Diagnosis     ICD-10-CM    1  Disorder of patellofemoral joint, unspecified laterality M22 2X9    2  Chronic pain of right knee M25 561     G89 29    3  Chronic pain of left knee M25 562     G89 29        Start Time: 7353  Stop Time: 1800  Total time in clinic (min): 55 minutes    Assessment/Plan   Assessment details: Houston Anderson has been seen in outpatient PT for 10 sessions beginning 2020  Patient is a 45 y o  male with diagnosis of bilateral knee pain and past medical history significant for hernias with repair, plantar fasciitis  Findings today show improvement in left and right knee pain symptoms, patient reports minimal progress with right knee function  Did note significant weakness of right hip abductors and extensors with tightness of hamstrings when compared bilaterally likely contributing to symptoms  Continuation of skilled PT indicated to work on hip and core strength as well as right lower extremity to improve overall functional stability and decrease stress through right knee  Impairments: abnormal gait, activity intolerance, impaired balance, impaired physical strength, lacks appropriate home exercise program, pain with function, weight-bearing intolerance and poor body mechanics    Goals  STG (4 weeks)  1  Patient will have reported 0/10 pain in right knee at rest  - not met  2  Improve patient's right knee flexion to 132 degrees pain free for increased ability to take proper strides during ambulation  - MET  3  Increase patient's right single leg balance to 15 seconds for increased stability on stairs  - MET  LTG (8 weeks)  1  Patient's LE strength will be equal bilaterally for ability to ambulate and return to functional activities at PLOF  - progressing  2  Patient will be able to walk 1 mile with 0/10 pain in right knee   - not met  3  Patient will be independent with home exercise program for continued maintenance post PT discharge  Plan  Plan details: Re-assessment in 4 weeks  Patient would benefit from: skilled physical therapy  Planned modality interventions: unattended electrical stimulation, thermotherapy: hydrocollator packs and cryotherapy  Planned therapy interventions: manual therapy, neuromuscular re-education, therapeutic activities, therapeutic exercise, home exercise program and gait training  Frequency: 2x week  Duration in weeks: 8  Plan of Care beginning date: 1/7/2020  Plan of Care expiration date: 3/7/2020  Treatment plan discussed with: PTA and patient      Subjective   History of Present Illness  Date of onset: 9/7/2019  Subjective 2/6/20: Patient states 90% improvement in left knee pain since the start of PT  Does not feel any significant change on the right side  Continues to have pain with transfers, descending stairs, walking >1 minute  Pain in right knee located over patella and medial aspect of right knee  F/u with referring provider 2/18/20  Mechanism of injury: Josselyn Salamanca is a 45 y o  male who presents to outpatient Physical Therapy today with complaints of R>L knee pain  Patient states he has had chronic bilateral knee pain for years, but increased in September when he increased running mileage  Backed off of running, but had minimal relief  Saw ortho earlier today who reviewed x-rays, referred him to PT and Rx an antiinflammatory  NDV in 2 weeks       X-ray Impression: No osseus deformity bilaterally    Pain      2/6  Pain scale: R=6 L=3     R= 4 L= 1  Pain scale at lowest: R=3 L=0   R= 3 L= 0  Pain scale at highest: R=8 L=3  R= 6 L= 3  Location: medial aspect of bilateral knees  Quality: sharp and knife-like ("feels like a knee hernando that never goes away")  Relieving factors: rest and relaxation  Aggravating factors: running  Progression: no change    Social Support  Steps to enter house: yes  5  Stairs in house: yes   40  Lives in: multiple-level home    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  )    Diagnostic Tests  X-ray: normal  Patient Goals  Patient goal: Strengthen joint and be able to walk without pain    Objective   Observations   Left Knee   Negative for edema  Right Knee   Negative for edema  Tenderness   Left Knee   Tenderness in the medial joint line  No tenderness in the ITB, patellar tendon and tibial tubercle  Right Knee   Tenderness in the medial joint line, pes anserine, and tibial tubercle  No tenderness in the ITB and patellar tendon  Neurological Testing     Sensation     Knee   Left Knee   Intact: light touch    Right Knee   Intact: light touch     Active Range of Motion  2/6  Left Knee   Flexion: 134 degrees   Extension: -4 degrees  6 degrees     Right Knee   Flexion: 130 degrees   133 degrees   Extension: 0 degrees     Mobility   Patellar Mobility:   Left Knee   WFL: medial, lateral, superior and inferior       Right Knee   WFL: medial, lateral, superior and inferior    Strength/Myotome Testing  2/6    Left Hip   Planes of Motion   Flexion: 5    5  Extension: 4    4  Abduction: 4    4+    Right Hip   Planes of Motion   Flexion: 5    5  Extension: 4-    4  Abduction: 4-    4-    Left Knee   Normal strength   WNL  Quadriceps contraction: good    Right Knee   Flexion: 4    4+ pain free  Extension: 4    4+ pain free  Quadriceps contraction: good    Tests       2/6    Additional Tests Details  Hamstring 90/90 SLR R= (31) L= (32) R= (33) L=(24)  Jsign (-) left, (+) right    (-) right   R>L ITB tightness    (-) left, (+) right   Pain with right deep knee flexion  No change  SLB EO     R= 30 sec L= 25 sec  SLB EC     R= 4 sec L=8 sec    General Comments:      Knee Comments  Gait: Antalgic, decreased stance time on RLE, increased LLE supination with toe in  FOTO: 42% was 50% function, 68% predicted function            Precautions: none noted; R>L knee pain  Re-Evaluation:  3/6  POC: 3/7    Knee Specialty Daily Treatment Diary     Manual  1/23 1/27 1/30 2/6           Hamstring stretch        Quad stretch        ITB stretch        ITB roller Foam roller declined Foam roller  Right only       Exercise Diary  1/23 1/27 1/30 2/3 2/6   Biodex Bike S=9 100/50 10 min Elliptical  100/50 10 min Elliptical elipitical   Elliptical np L2  x10min bike L2 10 min L2 10 min   bridges 10"x10 :10x10 10"x10 10"x10 10"x10   sidelying clamshell Green bilat 30x Green B/L x30 Green B/L x30 Green B/L x30 Green B/L x30   Hip flex with ER 1# 3x10 1# 3x10 1# 3x10 bilat 2#3x10 B/L prn   sidelying SLR 1# 3x10 1# 3x10 1# 3x10 bilat 2#3x10 B/L Against wall next visit   Prone hip ext; knee straight and bent 1# 3x10ea bilat 1# 3x10 ea B/L  1# 3x10 ea B/L 2#3x10 ea B/L Next visit   SAQ        LAQ With ball squeeze 30x With ball squeeze 30x With ball squeeze 30x 1# With ball squeeze 30x Next visit   Hamstring stretch 3x30" seated bilat 3x30" seated bilat 3x30" seated bilat 3x30" seated bilat 3x30" long sit right   Calf stretch        Standing hamstring curls        TKE Blue 15x bilat Blue 15x bilat Blue 30x bilat Blue 30x bilat Blue 30x bilat   Leg press Squat With ball squeeze   And TB S=6 75# 3x10ea With ball squeeze   And TB S=6 75# 3x10ea With ball squeeze   And TB S=6 75# 3x10ea With ball squeeze   And TB S=6 75# 3x10ea With ball squeeze   And TB S=6 75# 3x10ea   SLB     airex   Step up        Fitter board 30x ea with stops 30x ea with stops 30xfwd/ retro with stops 30xfwd/ retro with stops DC   Mini Squat        [place] nv x10ea x10ea bilat x10ea bilat With TB next visit   Fire hydrant  30x x30 Green TB 3x10 bilat Green TB 3x10 bilat Green TB 3x10 bilat   Core brace        Bird dog     Next visit                       Modalities 1/23

## 2020-02-07 ENCOUNTER — TRANSCRIBE ORDERS (OUTPATIENT)
Dept: PHYSICAL THERAPY | Facility: CLINIC | Age: 45
End: 2020-02-07

## 2020-02-07 DIAGNOSIS — M22.2X9 DISORDER OF PATELLOFEMORAL JOINT, UNSPECIFIED LATERALITY: Primary | ICD-10-CM

## 2020-02-10 ENCOUNTER — OFFICE VISIT (OUTPATIENT)
Dept: PHYSICAL THERAPY | Facility: CLINIC | Age: 45
End: 2020-02-10
Payer: COMMERCIAL

## 2020-02-10 DIAGNOSIS — M22.2X9 DISORDER OF PATELLOFEMORAL JOINT, UNSPECIFIED LATERALITY: Primary | ICD-10-CM

## 2020-02-10 PROCEDURE — 97140 MANUAL THERAPY 1/> REGIONS: CPT

## 2020-02-10 PROCEDURE — 97110 THERAPEUTIC EXERCISES: CPT

## 2020-02-10 NOTE — PATIENT INSTRUCTIONS
PT Re-Evaluation     Today's date: 2/10/2020  Patient name: Miguel Artis  : 1975  MRN: [id number]  Referring provider: Ruth Betancourt DO  Dx:   Encounter Diagnosis     ICD-10-CM    1  Disorder of patellofemoral joint, unspecified laterality M22 2X9        Start Time: 86  Stop Time: 1805  Total time in clinic (min): 60 minutes    Assessment/Plan   Assessment details: Miguel Artis has been seen in outpatient PT for 10 sessions beginning 2020  Patient is a 45 y o  male with diagnosis of bilateral knee pain and past medical history significant for hernias with repair, plantar fasciitis  Findings today show improvement in left and right knee pain symptoms, patient reports minimal progress with right knee function  Did note significant weakness of right hip abductors and extensors with tightness of hamstrings when compared bilaterally likely contributing to symptoms  Continuation of skilled PT indicated to work on hip and core strength as well as right lower extremity to improve overall functional stability and decrease stress through right knee  Impairments: abnormal gait, activity intolerance, impaired balance, impaired physical strength, lacks appropriate home exercise program, pain with function, weight-bearing intolerance and poor body mechanics    Goals  STG (4 weeks)  1  Patient will have reported 0/10 pain in right knee at rest  - not met  2  Improve patient's right knee flexion to 132 degrees pain free for increased ability to take proper strides during ambulation  - MET  3  Increase patient's right single leg balance to 15 seconds for increased stability on stairs  - MET  LTG (8 weeks)  1  Patient's LE strength will be equal bilaterally for ability to ambulate and return to functional activities at PLOF  - progressing  2  Patient will be able to walk 1 mile with 0/10 pain in right knee  - not met  3   Patient will be independent with home exercise program for continued maintenance post PT discharge  Plan  Plan details: Re-assessment in 4 weeks  Patient would benefit from: skilled physical therapy  Planned modality interventions: unattended electrical stimulation, thermotherapy: hydrocollator packs and cryotherapy  Planned therapy interventions: manual therapy, neuromuscular re-education, therapeutic activities, therapeutic exercise, home exercise program and gait training  Frequency: 2x week  Duration in weeks: 8  Plan of Care beginning date: 1/7/2020  Plan of Care expiration date: 3/7/2020  Treatment plan discussed with: PTA and patient      Subjective   History of Present Illness  Date of onset: 9/7/2019  Subjective 2/6/20: Patient states 90% improvement in left knee pain since the start of PT  Does not feel any significant change on the right side  Continues to have pain with transfers, descending stairs, walking >1 minute  Pain in right knee located over patella and medial aspect of right knee  F/u with referring provider 2/18/20  Mechanism of injury: Janae Quiñonez is a 45 y o  male who presents to outpatient Physical Therapy today with complaints of R>L knee pain  Patient states he has had chronic bilateral knee pain for years, but increased in September when he increased running mileage  Backed off of running, but had minimal relief  Saw ortho earlier today who reviewed x-rays, referred him to PT and Rx an antiinflammatory  NDV in 2 weeks       X-ray Impression: No osseus deformity bilaterally    Pain      2/6  Pain scale: R=6 L=3     R= 4 L= 1  Pain scale at lowest: R=3 L=0   R= 3 L= 0  Pain scale at highest: R=8 L=3  R= 6 L= 3  Location: medial aspect of bilateral knees  Quality: sharp and knife-like ("feels like a knee hernando that never goes away")  Relieving factors: rest and relaxation  Aggravating factors: running  Progression: no change    Social Support  Steps to enter house: yes  5  Stairs in house: yes   40  Lives in: multiple-level home    Employment status: working ([employer] work with constant sit to stand activity  )    Diagnostic Tests  X-ray: normal  Patient Goals  Patient goal: Strengthen joint and be able to walk without pain    Objective   Observations   Left Knee   Negative for edema  Right Knee   Negative for edema  Tenderness   Left Knee   Tenderness in the medial joint line  No tenderness in the ITB, patellar tendon and tibial tubercle  Right Knee   Tenderness in the medial joint line, pes anserine, and tibial tubercle  No tenderness in the ITB and patellar tendon  Neurological Testing     Sensation     Knee   Left Knee   Intact: light touch    Right Knee   Intact: light touch     Active Range of Motion  2/6  Left Knee   Flexion: 134 degrees   Extension: -4 degrees  6 degrees     Right Knee   Flexion: 130 degrees   133 degrees   Extension: 0 degrees     Mobility   Patellar Mobility:   Left Knee   WFL: medial, lateral, superior and inferior       Right Knee   WFL: medial, lateral, superior and inferior    Strength/Myotome Testing  2/6    Left Hip   Planes of Motion   Flexion: 5    5  Extension: 4    4  Abduction: 4    4+    Right Hip   Planes of Motion   Flexion: 5    5  Extension: 4-    4  Abduction: 4-    4-    Left Knee   Normal strength   WNL  Quadriceps contraction: good    Right Knee   Flexion: 4    4+ pain free  Extension: 4    4+ pain free  Quadriceps contraction: good    Tests       2/6    Additional Tests Details  Hamstring 90/90 SLR R= (31) L= (32) R= (33) L=(24)  Jsign (-) left, (+) right    (-) right   R>L ITB tightness    (-) left, (+) right   Pain with right deep knee flexion  No change  SLB EO     R= 30 sec L= 25 sec  SLB EC     R= 4 sec L=8 sec    General Comments:      Knee Comments  Gait: Antalgic, decreased stance time on RLE, increased LLE supination with toe in  FOTO: 42% was 50% function, 68% predicted function            Precautions: none noted; R>L knee pain  Re-Evaluation:  3/6  POC: 3/7    Knee Specialty Daily Treatment Diary     Manual 1/23 1/27 1/30  2/6           Hamstring stretch        Quad stretch        ITB stretch        ITB roller Foam roller declined Foam roller  Right only       Exercise Diary  1/23 1/27 1/30 2/3 2/6   Biodex Bike S=9 100/50 10 min Elliptical  100/50 10 min Elliptical elipitical   Elliptical np L2  x10min bike L2 10 min L2 10 min   bridges 10"x10 :10x10 10"x10 10"x10 10"x10   sidelying clamshell Green bilat 30x Green B/L x30 Green B/L x30 Green B/L x30 Green B/L x30   Hip flex with ER 1# 3x10 1# 3x10 1# 3x10 bilat 2#3x10 B/L prn   sidelying SLR 1# 3x10 1# 3x10 1# 3x10 bilat 2#3x10 B/L Against wall next visit   Prone hip ext; knee straight and bent 1# 3x10ea bilat 1# 3x10 ea B/L  1# 3x10 ea B/L 2#3x10 ea B/L Next visit   SAQ        LAQ With ball squeeze 30x With ball squeeze 30x With ball squeeze 30x 1# With ball squeeze 30x Next visit   Hamstring stretch 3x30" seated bilat 3x30" seated bilat 3x30" seated bilat 3x30" seated bilat 3x30" long sit right   Calf stretch        Standing hamstring curls        TKE Blue 15x bilat Blue 15x bilat Blue 30x bilat Blue 30x bilat Blue 30x bilat   Leg press Squat With ball squeeze   And TB S=6 75# 3x10ea With ball squeeze   And TB S=6 75# 3x10ea With ball squeeze   And TB S=6 75# 3x10ea With ball squeeze   And TB S=6 75# 3x10ea With ball squeeze   And TB S=6 75# 3x10ea   SLB     airex   Step up        Fitter board 30x ea with stops 30x ea with stops 30xfwd/ retro with stops 30xfwd/ retro with stops DC   Mini Squat        [place] nv x10ea x10ea bilat x10ea bilat With TB next visit   Fire hydrant  30x x30 Green TB 3x10 bilat Green TB 3x10 bilat Green TB 3x10 bilat   Core brace        Bird dog     Next visit                       Modalities 1/23

## 2020-02-10 NOTE — PROGRESS NOTES
30x bilat   Leg press Squat With ball squeeze   And TB S=6 75# 4x10ea With ball squeeze   And TB S=6 75# 3x10ea With ball squeeze   And TB S=6 75# 3x10ea With ball squeeze   And TB S=6 75# 3x10ea With ball squeeze   And TB S=6 75# 3x10ea   SLB     airex   Step up        Fitter board  30x ea with stops 30xfwd/ retro with stops 30xfwd/ retro with stops DC   Mini Squat        Violet *Red B   20x ea x10ea x10ea bilat x10ea bilat With TB next visit   Fire hydrant  Green TB 4x10 bilat x30 Green TB 3x10 bilat Green TB 3x10 bilat Green TB 3x10 bilat   Core brace        Bird dog **B 1x10    Next visit                       Modalities 2 10 20        n/a

## 2020-02-13 ENCOUNTER — APPOINTMENT (OUTPATIENT)
Dept: PHYSICAL THERAPY | Facility: CLINIC | Age: 45
End: 2020-02-13
Payer: COMMERCIAL

## 2020-02-17 ENCOUNTER — APPOINTMENT (OUTPATIENT)
Dept: PHYSICAL THERAPY | Facility: CLINIC | Age: 45
End: 2020-02-17
Payer: COMMERCIAL

## 2020-02-21 NOTE — PROGRESS NOTES
PT Discharge    Today's date: 2020  Patient name: Janae Quiñonez  : 1975  MRN: [id number]  Referring provider: Toña Shafer DO  Dx:   Encounter Diagnosis     ICD-10-CM    1  Disorder of patellofemoral joint, unspecified laterality M22 2X9          Assessment/Plan   Assessment details: Janae Quiñonez has been seen in outpatient PT for 11 sessions beginning 2020  Patient is a 45 y o  male with diagnosis of bilateral knee pain and past medical history significant for hernias with repair, plantar fasciitis  Patient called after last appointment and spoke to support staff stating he would like to be discharged to home exercise program  FOTO score taken over the phone at 59% function, increasing from 50% at time of initial evaluation  Self DC from skilled PT at this time, please see below re-evaluation from 2020 for most current objective findings  Goals  STG (4 weeks)  1  Patient will have reported 0/10 pain in right knee at rest  - not met  2  Improve patient's right knee flexion to 132 degrees pain free for increased ability to take proper strides during ambulation  - MET  3  Increase patient's right single leg balance to 15 seconds for increased stability on stairs  - MET  LTG (8 weeks)  1  Patient's LE strength will be equal bilaterally for ability to ambulate and return to functional activities at PLOF  - progressing  2  Patient will be able to walk 1 mile with 0/10 pain in right knee  - not met  3  Patient will be independent with home exercise program for continued maintenance post PT discharge  Plan  Plan details: DC PT  Plan of Care beginning date: 2020  Plan of Care expiration date: 3/7/2020  Treatment plan discussed with: patient      Subjective   History of Present Illness  Date of onset: 2019  Subjective 20: Patient states 90% improvement in left knee pain since the start of PT  Does not feel any significant change on the right side   Continues to have pain with transfers, descending stairs, walking >1 minute  Pain in right knee located over patella and medial aspect of right knee  F/u with referring provider 2/18/20  Mechanism of injury: Gertrude Diop is a 45 y o  male who presents to outpatient Physical Therapy today with complaints of R>L knee pain  Patient states he has had chronic bilateral knee pain for years, but increased in September when he increased running mileage  Backed off of running, but had minimal relief  Saw ortho earlier today who reviewed x-rays, referred him to PT and Rx an antiinflammatory  NDV in 2 weeks  X-ray Impression: No osseus deformity bilaterally    Pain      2/6  Pain scale: R=6 L=3     R= 4 L= 1  Pain scale at lowest: R=3 L=0   R= 3 L= 0  Pain scale at highest: R=8 L=3  R= 6 L= 3  Location: medial aspect of bilateral knees  Quality: sharp and knife-like ("feels like a knee hernando that never goes away")  Relieving factors: rest and relaxation  Aggravating factors: running  Progression: no change    Social Support  Steps to enter house: yes  5  Stairs in house: yes   40  Lives in: multiple-level home    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  )    Diagnostic Tests  X-ray: normal  Patient Goals  Patient goal: Strengthen joint and be able to walk without pain    Objective   Observations   Left Knee   Negative for edema  Right Knee   Negative for edema  Tenderness   Left Knee   Tenderness in the medial joint line  No tenderness in the ITB, patellar tendon and tibial tubercle  Right Knee   Tenderness in the medial joint line, pes anserine, and tibial tubercle  No tenderness in the ITB and patellar tendon       Neurological Testing     Sensation     Knee   Left Knee   Intact: light touch    Right Knee   Intact: light touch     Active Range of Motion  2/6  Left Knee   Flexion: 134 degrees   Extension: -4 degrees  6 degrees     Right Knee   Flexion: 130 degrees   133 degrees   Extension: 0 degrees Mobility   Patellar Mobility:   Left Knee   WFL: medial, lateral, superior and inferior       Right Knee   WFL: medial, lateral, superior and inferior    Strength/Myotome Testing  2/6    Left Hip   Planes of Motion   Flexion: 5    5  Extension: 4    4  Abduction: 4    4+    Right Hip   Planes of Motion   Flexion: 5    5  Extension: 4-    4  Abduction: 4-    4-    Left Knee   Normal strength   WNL  Quadriceps contraction: good    Right Knee   Flexion: 4    4+ pain free  Extension: 4    4+ pain free  Quadriceps contraction: good    Tests       2/6    Additional Tests Details  Hamstring 90/90 SLR R= (31) L= (32) R= (33) L=(24)  Jsign (-) left, (+) right    (-) right   R>L ITB tightness    (-) left, (+) right   Pain with right deep knee flexion  No change  SLB EO     R= 30 sec L= 25 sec  SLB EC     R= 4 sec L=8 sec    General Comments:      Knee Comments  Gait: Antalgic, decreased stance time on RLE, increased LLE supination with toe in  FOTO: 42% was 50% function, 68% predicted function

## 2020-02-24 ENCOUNTER — APPOINTMENT (OUTPATIENT)
Dept: PHYSICAL THERAPY | Facility: CLINIC | Age: 45
End: 2020-02-24
Payer: COMMERCIAL

## 2020-02-27 ENCOUNTER — APPOINTMENT (OUTPATIENT)
Dept: PHYSICAL THERAPY | Facility: CLINIC | Age: 45
End: 2020-02-27
Payer: COMMERCIAL

## 2023-03-23 ENCOUNTER — OFFICE VISIT (OUTPATIENT)
Dept: FAMILY MEDICINE CLINIC | Facility: CLINIC | Age: 48
End: 2023-03-23

## 2023-03-23 VITALS
TEMPERATURE: 97.4 F | BODY MASS INDEX: 36.38 KG/M2 | SYSTOLIC BLOOD PRESSURE: 130 MMHG | RESPIRATION RATE: 18 BRPM | HEART RATE: 88 BPM | HEIGHT: 67 IN | OXYGEN SATURATION: 97 % | WEIGHT: 231.8 LBS | DIASTOLIC BLOOD PRESSURE: 90 MMHG

## 2023-03-23 DIAGNOSIS — Z12.11 SCREEN FOR COLON CANCER: ICD-10-CM

## 2023-03-23 DIAGNOSIS — R19.4 FREQUENT BOWEL MOVEMENTS: ICD-10-CM

## 2023-03-23 DIAGNOSIS — Z11.59 NEED FOR HEPATITIS C SCREENING TEST: ICD-10-CM

## 2023-03-23 DIAGNOSIS — Z11.4 SCREENING FOR HIV (HUMAN IMMUNODEFICIENCY VIRUS): ICD-10-CM

## 2023-03-23 DIAGNOSIS — Z13.6 SCREENING FOR HEART DISEASE: ICD-10-CM

## 2023-03-23 DIAGNOSIS — R06.09 DOE (DYSPNEA ON EXERTION): Primary | ICD-10-CM

## 2023-03-23 DIAGNOSIS — Z13.1 SCREENING FOR DIABETES MELLITUS: ICD-10-CM

## 2023-03-23 DIAGNOSIS — K21.9 GASTROESOPHAGEAL REFLUX DISEASE, UNSPECIFIED WHETHER ESOPHAGITIS PRESENT: ICD-10-CM

## 2023-03-23 DIAGNOSIS — Z00.00 ENCOUNTER FOR PREVENTIVE CARE: ICD-10-CM

## 2023-03-23 DIAGNOSIS — E55.9 VITAMIN D INSUFFICIENCY: ICD-10-CM

## 2023-03-23 DIAGNOSIS — Z13.29 SCREENING FOR THYROID DISORDER: ICD-10-CM

## 2023-03-23 RX ORDER — TAMSULOSIN HYDROCHLORIDE 0.4 MG/1
1 CAPSULE ORAL
COMMUNITY
Start: 2023-01-30

## 2023-03-23 RX ORDER — CHOLECALCIFEROL (VITAMIN D3) 50 MCG
2 TABLET ORAL DAILY
COMMUNITY

## 2023-03-23 RX ORDER — ALBUTEROL SULFATE 90 UG/1
AEROSOL, METERED RESPIRATORY (INHALATION)
COMMUNITY
End: 2023-03-23 | Stop reason: ALTCHOICE

## 2023-03-23 RX ORDER — GEMFIBROZIL 600 MG/1
TABLET, FILM COATED ORAL 2 TIMES DAILY
COMMUNITY
Start: 2022-09-22 | End: 2023-09-23

## 2023-03-23 RX ORDER — FAMOTIDINE 20 MG/1
20 TABLET, FILM COATED ORAL 2 TIMES DAILY
Qty: 60 TABLET | Refills: 0 | Status: SHIPPED | OUTPATIENT
Start: 2023-03-23

## 2023-03-23 NOTE — PROGRESS NOTES
Name: Sukhi Moore      : 1975      MRN: [id number]  Encounter Provider: ROSE Fernandez  Encounter Date: 3/23/2023   Encounter department: [place] [id number]  PIERCE (dyspnea on exertion)  Assessment & Plan:  Worsening symptoms of pierce over the last couple of months, denies wheezing but chest feels "irritated", heavy when he lays down  EKG normal, lungs clear, vitals normal   Sounds like he has untreated gerd, which could contribute  His weight may play a role as well as deconditioning  Will check labs, echocardiogram   Treat gerd as above  F/u 1 month    Orders:  -     Echo complete w/ contrast if indicated; Future; Expected date: 2023  -     POCT ECG    2  Screen for colon cancer  -     Ambulatory referral for colonoscopy; Future  -     Ambulatory Referral to Gastroenterology; Future    3  Vitamin D insufficiency  -     Vitamin D 25 hydroxy; Future    4  Screening for diabetes mellitus  -     Comprehensive metabolic panel; Future  -     Hemoglobin A1C; Future    5  Screening for heart disease  -     Lipid panel; Future    6  Screening for thyroid disorder  -     TSH, 3rd generation with Free T4 reflex; Future    7  Encounter for preventive care  -     Lipid panel; Future  -     Comprehensive metabolic panel; Future  -     CBC and differential; Future  -     UA w Reflex to Microscopic w Reflex to Culture    8  Screening for HIV (human immunodeficiency virus)  -     HIV 1/2 AG/AB w Reflex [facility] for 2 yr old and above; Future    9  Need for hepatitis C screening test  -     Hepatitis C Antibody ([facility]); Future    10  Gastroesophageal reflux disease, unspecified whether esophagitis present  Assessment & Plan:  Heartburn symptoms start daily after lunch, worsen when supine in bed at night  He feels he may have a hiatal hernia  Occasionally uses tums  Reflux could be contributing to chest irritation and heaviness when supine    Start pepcid bid, avoid triggering foods  referral to GI   F/u 1 month  Orders:  -     Ambulatory Referral to Gastroenterology; Future  -     famotidine (PEPCID) 20 mg tablet; Take 1 tablet (20 mg total) by mouth 2 (two) times a day    11  Frequent bowel movements  Assessment & Plan:  Pt has multiple bm per day, 50% of stools are loose  Denies blood in stool  Mom has IBS, paternal GF with colon ca  Reports he tested neg for IBD in the past   Avoid dietary triggers such as dairy, referral to GI  Orders:  -     Ambulatory Referral to Gastroenterology; Future      BMI Counseling: Body mass index is 36 31 kg/m²  The BMI is above normal  Nutrition recommendations include decreasing portion sizes, encouraging healthy choices of fruits and vegetables, consuming healthier snacks, moderation in carbohydrate intake, increasing intake of lean protein and reducing intake of saturated and trans fat  Exercise recommendations include moderate physical activity 150 minutes/week, exercising 3-5 times per week and strength training exercises  No pharmacotherapy was ordered  Rationale for BMI follow-up plan is due to patient being overweight or obese  Depression Screening and Follow-up Plan: Patient was screened for depression during today's encounter  They screened negative with a PHQ-2 score of 0  Subjective      Pt is a 40 yo male here today to establish care and for evaluation of wheezing  He states for 2 months he has been noticing worsening tuttle and wheezing  He has chest irritation and his chest itches  He says he has a dry cough; only coughs in the afternoons after he eats lunch  He notes worsening symptoms when he lays flat on his back, he says he gets heartburn symptoms at night  He describes having symptoms that are "close to wheezing "  He denies chest pain, palpitations, lightheadedness, dizziness, blurry vision  He has no nausea or vomiting    Reports history of probable IBS, he has multiple bowel movements per day   Stool is formed 50% of the time  He was previously tested for lactose intolerance, Crohn's and he was negative  He notices dairy seems to cause diarrhea, cramping  No blood in stool  Paternal grandfather had colon cancer, mom has IBS  He denies history of asthma  He says he had covid every year since the pandemic started, though he never tested positive  Illness was associated with wheezing every time  Review of Systems   Constitutional: Negative for appetite change, chills, fatigue and fever  HENT: Positive for sneezing  Negative for congestion, hearing loss, sinus pressure and sore throat  Eyes: Negative for visual disturbance  Respiratory: Positive for chest tightness and shortness of breath  Negative for cough and wheezing  Cardiovascular: Negative for chest pain and palpitations  Gastrointestinal: Positive for abdominal distention, abdominal pain and diarrhea  Negative for nausea and vomiting  Musculoskeletal: Positive for arthralgias (B knee pain)  Negative for myalgias  Neurological: Negative for dizziness, light-headedness and headaches  Current Outpatient Medications on File Prior to Visit   Medication Sig   • Cholecalciferol (Vitamin D) 50 MCG (2000 UT) tablet Take 2 tablets by mouth daily   • gemfibrozil (LOPID) 600 mg tablet Take by mouth 2 (two) times a day   • tamsulosin (FLOMAX) 0 4 mg Take 1 capsule by mouth daily at bedtime   • [DISCONTINUED] albuterol (PROVENTIL HFA,VENTOLIN HFA) 90 mcg/act inhaler albuterol sulfate HFA 90 mcg/actuation aerosol inhaler (Patient not taking: Reported on 3/23/2023)       Objective     /90 (BP Location: Left arm, Patient Position: Sitting, Cuff Size: Large)   Pulse 88   Temp (!) 97 4 °F (36 3 °C) (Tympanic)   Resp 18   Ht 5' 7" (1 702 m)   Wt 105 kg (231 lb 12 8 oz)   SpO2 97%   BMI 36 31 kg/m²     Physical Exam  Vitals reviewed  Constitutional:       General: He is awake  He is not in acute distress  Appearance: Normal appearance  He is well-developed and well-groomed  He is not ill-appearing  HENT:      Right Ear: Hearing, tympanic membrane, ear canal and external ear normal  No middle ear effusion  Left Ear: Hearing, tympanic membrane, ear canal and external ear normal   No middle ear effusion  Nose: No mucosal edema  Mouth/Throat:      Mouth: Mucous membranes are not dry  Pharynx: No oropharyngeal exudate or posterior oropharyngeal erythema  Tonsils: No tonsillar abscesses  Eyes:      Conjunctiva/sclera: Conjunctivae normal    Cardiovascular:      Rate and Rhythm: Normal rate and regular rhythm  Heart sounds: Normal heart sounds  No murmur heard  Pulmonary:      Effort: Pulmonary effort is normal       Breath sounds: Normal breath sounds  Abdominal:      General: Bowel sounds are normal       Palpations: Abdomen is soft  Tenderness: There is no abdominal tenderness  Lymphadenopathy:      Head:      Right side of head: No submental, submandibular, tonsillar, preauricular, posterior auricular or occipital adenopathy  Left side of head: No submental, submandibular, tonsillar, preauricular, posterior auricular or occipital adenopathy  Cervical: No cervical adenopathy  Skin:     General: Skin is warm and dry  Neurological:      Mental Status: He is alert and oriented to person, place, and time  Psychiatric:         Speech: Speech normal          Behavior: Behavior normal  Behavior is cooperative  Thought Content:  Thought content normal          Judgment: Judgment normal        ROSE Siegel

## 2023-03-23 NOTE — ASSESSMENT & PLAN NOTE
Worsening symptoms of tuttle over the last couple of months, denies wheezing but chest feels "irritated", heavy when he lays down  EKG normal, lungs clear, vitals normal   Sounds like he has untreated gerd, which could contribute  His weight may play a role as well as deconditioning  Will check labs, echocardiogram   Treat gerd as above    F/u 1 month

## 2023-03-23 NOTE — ASSESSMENT & PLAN NOTE
Pt has multiple bm per day, 50% of stools are loose  Denies blood in stool  Mom has IBS, paternal GF with colon ca  Reports he tested neg for IBD in the past   Avoid dietary triggers such as dairy, referral to GI

## 2023-03-23 NOTE — ASSESSMENT & PLAN NOTE
Heartburn symptoms start daily after lunch, worsen when supine in bed at night  He feels he may have a hiatal hernia  Occasionally uses tums  Reflux could be contributing to chest irritation and heaviness when supine  Start pepcid bid, avoid triggering foods  referral to GI   F/u 1 month

## 2023-03-24 ENCOUNTER — APPOINTMENT (OUTPATIENT)
Dept: LAB | Facility: CLINIC | Age: 48
End: 2023-03-24

## 2023-03-24 DIAGNOSIS — Z13.1 SCREENING FOR DIABETES MELLITUS: ICD-10-CM

## 2023-03-24 DIAGNOSIS — Z11.59 NEED FOR HEPATITIS C SCREENING TEST: ICD-10-CM

## 2023-03-24 DIAGNOSIS — Z13.6 SCREENING FOR HEART DISEASE: ICD-10-CM

## 2023-03-24 DIAGNOSIS — Z00.00 ENCOUNTER FOR PREVENTIVE CARE: ICD-10-CM

## 2023-03-24 DIAGNOSIS — E55.9 VITAMIN D INSUFFICIENCY: ICD-10-CM

## 2023-03-24 DIAGNOSIS — Z13.29 SCREENING FOR THYROID DISORDER: ICD-10-CM

## 2023-03-24 DIAGNOSIS — Z11.4 SCREENING FOR HIV (HUMAN IMMUNODEFICIENCY VIRUS): ICD-10-CM

## 2023-03-24 LAB
25(OH)D3 SERPL-MCNC: 23.7 NG/ML (ref 30–100)
ALBUMIN SERPL BCP-MCNC: 4.5 G/DL (ref 3.5–5)
ALP SERPL-CCNC: 69 U/L (ref 34–104)
ALT SERPL W P-5'-P-CCNC: 69 U/L (ref 7–52)
ANION GAP SERPL CALCULATED.3IONS-SCNC: 9 MMOL/L (ref 4–13)
AST SERPL W P-5'-P-CCNC: 41 U/L (ref 13–39)
BASOPHILS # BLD AUTO: 0.03 THOUSANDS/ÂΜL (ref 0–0.1)
BASOPHILS NFR BLD AUTO: 1 % (ref 0–1)
BILIRUB SERPL-MCNC: 0.68 MG/DL (ref 0.2–1)
BILIRUB UR QL STRIP: NEGATIVE
BUN SERPL-MCNC: 11 MG/DL (ref 5–25)
CALCIUM SERPL-MCNC: 9.8 MG/DL (ref 8.4–10.2)
CHLORIDE SERPL-SCNC: 103 MMOL/L (ref 96–108)
CHOLEST SERPL-MCNC: 202 MG/DL
CLARITY UR: CLEAR
CO2 SERPL-SCNC: 27 MMOL/L (ref 21–32)
COLOR UR: COLORLESS
CREAT SERPL-MCNC: 0.9 MG/DL (ref 0.6–1.3)
EOSINOPHIL # BLD AUTO: 0.14 THOUSAND/ÂΜL (ref 0–0.61)
EOSINOPHIL NFR BLD AUTO: 3 % (ref 0–6)
ERYTHROCYTE [DISTWIDTH] IN BLOOD BY AUTOMATED COUNT: 12.3 % (ref 11.6–15.1)
EST. AVERAGE GLUCOSE BLD GHB EST-MCNC: 108 MG/DL
GFR SERPL CREATININE-BSD FRML MDRD: 102 ML/MIN/1.73SQ M
GLUCOSE P FAST SERPL-MCNC: 89 MG/DL (ref 65–99)
GLUCOSE UR STRIP-MCNC: NEGATIVE MG/DL
HBA1C MFR BLD: 5.4 %
HCT VFR BLD AUTO: 46 % (ref 36.5–49.3)
HDLC SERPL-MCNC: 40 MG/DL
HGB BLD-MCNC: 15.7 G/DL (ref 12–17)
HGB UR QL STRIP.AUTO: NEGATIVE
IMM GRANULOCYTES # BLD AUTO: 0.05 THOUSAND/UL (ref 0–0.2)
IMM GRANULOCYTES NFR BLD AUTO: 1 % (ref 0–2)
KETONES UR STRIP-MCNC: NEGATIVE MG/DL
LDLC SERPL CALC-MCNC: 119 MG/DL (ref 0–100)
LEUKOCYTE ESTERASE UR QL STRIP: NEGATIVE
LYMPHOCYTES # BLD AUTO: 2.04 THOUSANDS/ÂΜL (ref 0.6–4.47)
LYMPHOCYTES NFR BLD AUTO: 39 % (ref 14–44)
MCH RBC QN AUTO: 29.8 PG (ref 26.8–34.3)
MCHC RBC AUTO-ENTMCNC: 34.1 G/DL (ref 31.4–37.4)
MCV RBC AUTO: 87 FL (ref 82–98)
MONOCYTES # BLD AUTO: 0.42 THOUSAND/ÂΜL (ref 0.17–1.22)
MONOCYTES NFR BLD AUTO: 8 % (ref 4–12)
NEUTROPHILS # BLD AUTO: 2.5 THOUSANDS/ÂΜL (ref 1.85–7.62)
NEUTS SEG NFR BLD AUTO: 48 % (ref 43–75)
NITRITE UR QL STRIP: NEGATIVE
NONHDLC SERPL-MCNC: 162 MG/DL
NRBC BLD AUTO-RTO: 0 /100 WBCS
PH UR STRIP.AUTO: 5.5 [PH]
PLATELET # BLD AUTO: 255 THOUSANDS/UL (ref 149–390)
PMV BLD AUTO: 9.5 FL (ref 8.9–12.7)
POTASSIUM SERPL-SCNC: 4.2 MMOL/L (ref 3.5–5.3)
PROT SERPL-MCNC: 7.7 G/DL (ref 6.4–8.4)
PROT UR STRIP-MCNC: NEGATIVE MG/DL
RBC # BLD AUTO: 5.27 MILLION/UL (ref 3.88–5.62)
SODIUM SERPL-SCNC: 139 MMOL/L (ref 135–147)
SP GR UR STRIP.AUTO: 1.01 (ref 1–1.03)
TRIGL SERPL-MCNC: 217 MG/DL
TSH SERPL DL<=0.05 MIU/L-ACNC: 2.51 UIU/ML (ref 0.45–4.5)
UROBILINOGEN UR STRIP-ACNC: <2 MG/DL
WBC # BLD AUTO: 5.18 THOUSAND/UL (ref 4.31–10.16)

## 2023-03-25 LAB
HCV AB SER QL: NORMAL
HIV 1+2 AB+HIV1 P24 AG SERPL QL IA: NORMAL
HIV 2 AB SERPL QL IA: NORMAL
HIV1 AB SERPL QL IA: NORMAL
HIV1 P24 AG SERPL QL IA: NORMAL

## 2023-03-31 ENCOUNTER — HOSPITAL ENCOUNTER (OUTPATIENT)
Dept: NON INVASIVE DIAGNOSTICS | Facility: HOSPITAL | Age: 48
Discharge: HOME | End: 2023-03-31

## 2023-03-31 VITALS
WEIGHT: 231 LBS | SYSTOLIC BLOOD PRESSURE: 130 MMHG | DIASTOLIC BLOOD PRESSURE: 90 MMHG | HEART RATE: 88 BPM | HEIGHT: 67 IN | BODY MASS INDEX: 36.26 KG/M2

## 2023-03-31 DIAGNOSIS — R06.09 DOE (DYSPNEA ON EXERTION): ICD-10-CM

## 2023-03-31 LAB
AORTIC ROOT: 3.5 CM
APICAL FOUR CHAMBER EJECTION FRACTION: 62 %
E WAVE DECELERATION TIME: 178 MS
FRACTIONAL SHORTENING: 38 % (ref 28–44)
INTERVENTRICULAR SEPTUM IN DIASTOLE (PARASTERNAL SHORT AXIS VIEW): 1.2 CM
INTERVENTRICULAR SEPTUM: 1.2 CM (ref 0.6–1.1)
LAAS-AP2: 17.9 CM2
LAAS-AP4: 17.4 CM2
LEFT ATRIUM SIZE: 3.2 CM
LEFT INTERNAL DIMENSION IN SYSTOLE: 3.1 CM (ref 2.1–4)
LEFT VENTRICULAR INTERNAL DIMENSION IN DIASTOLE: 5 CM (ref 3.5–6)
LEFT VENTRICULAR POSTERIOR WALL IN END DIASTOLE: 1.2 CM
LEFT VENTRICULAR STROKE VOLUME: 79 ML
LVSV (TEICH): 79 ML
MV E'TISSUE VEL-SEP: 9 CM/S
MV PEAK A VEL: 0.54 M/S
MV PEAK E VEL: 81 CM/S
MV STENOSIS PRESSURE HALF TIME: 52 MS
MV VALVE AREA P 1/2 METHOD: 4.23 CM2
RA PRESSURE ESTIMATED: 5 MMHG
RIGHT ATRIAL 2D VOLUME: 29 ML
RIGHT ATRIUM AREA SYSTOLE A4C: 13.3 CM2
RIGHT VENTRICLE ID DIMENSION: 3.3 CM
RV PSP: 26 MMHG
SL CV LEFT ATRIUM LENGTH A2C: 5.1 CM
SL CV LV EF: 60
SL CV PED ECHO LEFT VENTRICLE DIASTOLIC VOLUME (MOD BIPLANE) 2D: 116 ML
SL CV PED ECHO LEFT VENTRICLE SYSTOLIC VOLUME (MOD BIPLANE) 2D: 37 ML
TR MAX PG: 21 MMHG
TR PEAK VELOCITY: 2.3 M/S
TRICUSPID ANNULAR PLANE SYSTOLIC EXCURSION: 2.1 CM
TRICUSPID VALVE PEAK REGURGITATION VELOCITY: 2.28 M/S

## 2023-05-12 ENCOUNTER — OFFICE VISIT (OUTPATIENT)
Dept: FAMILY MEDICINE CLINIC | Facility: CLINIC | Age: 48
End: 2023-05-12

## 2023-05-12 VITALS
OXYGEN SATURATION: 98 % | HEIGHT: 67 IN | DIASTOLIC BLOOD PRESSURE: 90 MMHG | SYSTOLIC BLOOD PRESSURE: 140 MMHG | RESPIRATION RATE: 16 BRPM | WEIGHT: 230.6 LBS | TEMPERATURE: 97.2 F | HEART RATE: 72 BPM | BODY MASS INDEX: 36.19 KG/M2

## 2023-05-12 DIAGNOSIS — K21.9 GASTROESOPHAGEAL REFLUX DISEASE, UNSPECIFIED WHETHER ESOPHAGITIS PRESENT: ICD-10-CM

## 2023-05-12 DIAGNOSIS — I10 PRIMARY HYPERTENSION: Primary | ICD-10-CM

## 2023-05-12 DIAGNOSIS — R74.8 ELEVATED LIVER ENZYMES: ICD-10-CM

## 2023-05-12 DIAGNOSIS — E78.2 MIXED HYPERLIPIDEMIA: ICD-10-CM

## 2023-05-12 RX ORDER — AMLODIPINE BESYLATE 2.5 MG/1
2.5 TABLET ORAL DAILY
Qty: 30 TABLET | Refills: 5 | Status: SHIPPED | OUTPATIENT
Start: 2023-05-12

## 2023-05-12 RX ORDER — CLOBETASOL PROPIONATE 0.5 MG/G
CREAM TOPICAL 2 TIMES DAILY
COMMUNITY

## 2023-05-12 NOTE — ASSESSMENT & PLAN NOTE
Controlled on pepcid bid, continue treatment   Notes cough is gone unless he misses a dose  He should avoid triggering foods that are acidic and spicy  Scheduled to see GI in early June

## 2023-05-12 NOTE — ASSESSMENT & PLAN NOTE
Mild diastolic htn, systolic bp runs in the prehypertensive range generally  Start amlodipine 2 5 mg, continue to monitor bp occasionally at home  Call with concerns, otherwise f/u 3 months

## 2023-05-12 NOTE — PROGRESS NOTES
Name: Keren Wei      : 1975      MRN: [id number]  Encounter Provider: ROSE Bolaños  Encounter Date: 2023   Encounter department: [place]   I have spent a total time of 30 minutes on 23 in caring for this patient including Diagnostic results, Prognosis, Risks and benefits of tx options, Instructions for management, Patient and family education, Importance of tx compliance, Risk factor reductions, Impressions, Documenting in the medical record, Reviewing / ordering tests, medicine, procedures   and Obtaining or reviewing history    1  Primary hypertension  Assessment & Plan:  Mild diastolic htn, systolic bp runs in the prehypertensive range generally  Start amlodipine 2 5 mg, continue to monitor bp occasionally at home  Call with concerns, otherwise f/u 3 months  Orders:  -     amLODIPine (NORVASC) 2 5 mg tablet; Take 1 tablet (2 5 mg total) by mouth daily    2  Elevated liver enzymes  Assessment & Plan:  Not elevated last year  Will repeat, if still elevated will get ruq us  Orders:  -     Hepatic function panel; Future    3  Mixed hyperlipidemia  Assessment & Plan:  Discussed results, triglycerides elevated and he is on gemfibrozil   ldl and total cholesterol mildly elevated  Previously was on atorvastatin, he is not sure why he was taken off  ascvd risk score is 4 1%  At this time, will hold starting statin  Reinforce weight loss  4  Gastroesophageal reflux disease, unspecified whether esophagitis present  Assessment & Plan:  Controlled on pepcid bid, continue treatment   Notes cough is gone unless he misses a dose  He should avoid triggering foods that are acidic and spicy  Scheduled to see GI in early   Subjective      Pt is a 56 yo male here for f/u and review of testing results  At initial visit in late March, reported worsening PIERCE and wheezing with a dry cough in the afternoon after lunch  " Symptoms increase when supine with night time heartburn  No chest pain, palpitations, lightheadedness, dizziness, blurry vision reported  No n/v  He has history of \"probable\" ibs with multiple bm per day  Family history of colon ca, ibs  Exam was unremarkable  Labs ordered as well as echo, GI consultation  Started pt on famotidine 20 mg bid  Labs noted with hyperlipidemia, elevated ast/alt, vitamin D insufficiency  Echo showed mild concentric hypertrophy, mild mitral valve regurgitation with normal EF  Scheduled with GI early June  The pepcid has been helping him a lot, he has not had to cough other than days where he misses a dose of the famotidine  Review of Systems   Constitutional: Negative for appetite change, chills, fatigue and fever  Respiratory: Negative for cough and shortness of breath  Cardiovascular: Negative for chest pain  Neurological: Negative for dizziness and light-headedness  Current Outpatient Medications on File Prior to Visit   Medication Sig   • Cholecalciferol (Vitamin D) 50 MCG (2000 UT) tablet Take 2 tablets by mouth daily   • clobetasol (TEMOVATE) 0 05 % cream Apply topically 2 (two) times a day   • famotidine (PEPCID) 20 mg tablet Take 1 tablet (20 mg total) by mouth 2 (two) times a day   • gemfibrozil (LOPID) 600 mg tablet Take by mouth 2 (two) times a day   • tamsulosin (FLOMAX) 0 4 mg Take 1 capsule by mouth daily at bedtime (Patient not taking: Reported on 5/12/2023)       Objective     /90 (BP Location: Left arm, Patient Position: Sitting, Cuff Size: Standard)   Pulse 72   Temp (!) 97 2 °F (36 2 °C) (Tympanic)   Resp 16   Ht 5' 7\" (1 702 m)   Wt 105 kg (230 lb 9 6 oz)   SpO2 98%   BMI 36 12 kg/m²     Physical Exam  Vitals reviewed  Constitutional:       General: He is awake  He is not in acute distress  Appearance: Normal appearance  He is well-developed and well-groomed  He is not ill-appearing     Eyes:      General: Lids are normal  " Conjunctiva/sclera: Conjunctivae normal    Cardiovascular:      Rate and Rhythm: Normal rate and regular rhythm  Heart sounds: Normal heart sounds  No murmur heard  Pulmonary:      Effort: Pulmonary effort is normal  No respiratory distress  Skin:     General: Skin is dry  Neurological:      Mental Status: He is alert and oriented to person, place, and time  Psychiatric:         Attention and Perception: Attention normal          Mood and Affect: Mood normal          Speech: Speech normal          Behavior: Behavior normal  Behavior is cooperative  Thought Content:  Thought content normal          Cognition and Memory: Cognition normal          Judgment: Judgment normal        ROSE Mata

## 2023-05-12 NOTE — ASSESSMENT & PLAN NOTE
Discussed results, triglycerides elevated and he is on gemfibrozil   ldl and total cholesterol mildly elevated  Previously was on atorvastatin, he is not sure why he was taken off  ascvd risk score is 4 1%  At this time, will hold starting statin  Reinforce weight loss

## 2023-06-01 ENCOUNTER — CONSULT (OUTPATIENT)
Dept: GASTROENTEROLOGY | Facility: CLINIC | Age: 48
End: 2023-06-01

## 2023-06-01 VITALS
DIASTOLIC BLOOD PRESSURE: 76 MMHG | SYSTOLIC BLOOD PRESSURE: 118 MMHG | WEIGHT: 228.6 LBS | BODY MASS INDEX: 35.88 KG/M2 | HEIGHT: 67 IN | TEMPERATURE: 98.7 F

## 2023-06-01 DIAGNOSIS — K21.9 GASTROESOPHAGEAL REFLUX DISEASE, UNSPECIFIED WHETHER ESOPHAGITIS PRESENT: ICD-10-CM

## 2023-06-01 DIAGNOSIS — R19.4 FREQUENT BOWEL MOVEMENTS: ICD-10-CM

## 2023-06-01 DIAGNOSIS — Z12.11 SCREEN FOR COLON CANCER: ICD-10-CM

## 2023-06-01 RX ORDER — BISACODYL 5 MG/1
5 TABLET, DELAYED RELEASE ORAL ONCE
Qty: 2 TABLET | Refills: 0 | Status: SHIPPED | OUTPATIENT
Start: 2023-06-01 | End: 2023-06-01

## 2023-06-01 NOTE — PROGRESS NOTES
[facility] - Outpatient Consultation  Yoselyn Moore 57 y o  male MRN: [id number]  Encounter: [id number]          ASSESSMENT AND PLAN:        1  Screen for colon cancer  Ambulatory Referral to Gastroenterology    Colonoscopy    bisacodyl (DULCOLAX) 5 mg EC tablet    polyethylene glycol (GOLYTELY) 4000 mL solution      2  Gastroesophageal reflux disease, unspecified whether esophagitis present  Ambulatory Referral to Gastroenterology    EGD      3  Frequent bowel movements  Ambulatory Referral to Gastroenterology    Colonoscopy    bisacodyl (DULCOLAX) 5 mg EC tablet    polyethylene glycol (GOLYTELY) 4000 mL solution        Schedule for screening colonoscopy  Will perform EGD to evaluate/screen for Lr's esophagus given history of reflux  Continue pepcid for the reflux  Avoid foods that cause reflux    Discussed lifestyle interventions for reducing reflux including:  a  Loss of a few pounds to help reduce heartburn and regurgitation  b  Avoid late night meals  c  Elevate head of bed by 30 degrees  d  Avoid (or limit) foods that can trigger reflux including: citrus fruits, garlic/onions, carbonated beverages, caffeinated drinks, chocolate, mints, alcohol, high fat foods, and milk      The rationale for colonoscopy and endoscopy with possible biopsy, possible polypectomy, with IV sedation as well as all risks, benefits, and alternatives were discussed with the patient in detail  Risks including but not limited to perforation, bleeding, need for blood transfusion or emergent surgery, and missed neoplasm were reviewed in detail with the patient  The patient demonstrates full understanding and wishes to proceed  ______________________________________________________________    HPI:  Yoselyn Moore is a 57y o  year old male who presents to the office for evaluation of abdominal discomfort/GERD and colon cancer screening      He had some blood in his stool while in the service approximately 10 years ago  He has always had irritable bowel  He will go to the bathroom in 5 minutes after having a milkshake  Typically spicy foods and lactose containing products  Will go to the bathroom 5-6x a day  Eating less and more bland/consistent diet has been a remedy  He also started noting some reflux at night and started taking Pepcid twice daily and that improved  Anything spicy or tomato will cause reflux to flare up  He started having a cough and this improved after the Pepcid  He can tell when he is not taking the medicine  Int the service he did have a hernia repair likely from constant lifting  He does have intermittent pain throughout the abdomen  Has not had a colonoscopy in the past  No weight loss  No family history of colon cancer  Not on an blood thinners  REVIEW OF SYSTEMS:    Answers for HPI/ROS submitted by the patient on 5/30/2023  Chronicity: chronic  Onset: more than 1 year ago  Onset quality: gradual  Frequency: constantly  Episode duration: 4 Hours  Progression since onset: unchanged  Pain location: generalized abdominal region  Pain - numeric: 3/10  Pain quality: dull  Radiates to: does not radiate  anorexia: No  arthralgias: No  belching: Yes  constipation: No  diarrhea: Yes  dysuria: No  fever: No  frequency: Yes  headaches: No  hematochezia: No  melena: No  myalgias: No  nausea: Yes  weight loss: No  vomiting: No  Aggravated by: certain positions, drinking alcohol, eating, movement  Relieved by: being still, bowel movements, certain positions        CONSTITUTIONAL: Denies any fever, chills, rigors, and weight loss  HEENT: No earache or tinnitus  Denies hearing loss or visual disturbances  CARDIOVASCULAR: No chest pain or palpitations  RESPIRATORY: Denies any cough, hemoptysis, shortness of breath or dyspnea on exertion  GASTROINTESTINAL: As noted in the History of Present Illness  GENITOURINARY: No problems with urination   Denies any hematuria or "dysuria  NEUROLOGIC: No dizziness or vertigo, denies headaches  MUSCULOSKELETAL: Denies any muscle or joint pain  SKIN: Denies skin rashes or itching  ENDOCRINE: Denies excessive thirst  Denies intolerance to heat or cold  PSYCHOSOCIAL: Denies depression or anxiety  Denies any recent memory loss  Historical Information   Past Medical History:   Diagnosis Date   • Bronchitis    • COVID      Past Surgical History:   Procedure Laterality Date   • HERNIA REPAIR     • WISDOM TOOTH EXTRACTION Bilateral 2002     Social History   Social History     Substance and Sexual Activity   Alcohol Use Not Currently    Comment: socially     Social History     Substance and Sexual Activity   Drug Use Never     Social History     Tobacco Use   Smoking Status Never   • Passive exposure: Past   Smokeless Tobacco Never     Family History   Problem Relation Age of Onset   • Diabetes Mother    • No Known Problems Father    • No Known Problems Brother    • Colon cancer Paternal Grandfather    • Diabetes Maternal Aunt    • Diabetes Maternal Uncle    • Diabetes Paternal Aunt    • Diabetes Paternal Uncle        Meds/Allergies       Current Outpatient Medications:   •  amLODIPine (NORVASC) 2 5 mg tablet  •  Cholecalciferol (Vitamin D) 50 MCG (2000 UT) tablet  •  clobetasol (TEMOVATE) 0 05 % cream  •  famotidine (PEPCID) 20 mg tablet  •  gemfibrozil (LOPID) 600 mg tablet  •  tamsulosin (FLOMAX) 0 4 mg    Allergies   Allergen Reactions   • Penicillins Rash           Objective     Blood pressure 118/76, temperature 98 7 °F (37 1 °C), temperature source Tympanic, height 5' 7\" (1 702 m), weight 104 kg (228 lb 9 6 oz)  Body mass index is 35 8 kg/m²  PHYSICAL EXAM:      General Appearance:   Alert, cooperative, no distress   HEENT:   Normocephalic, atraumatic, anicteric           Lungs:   Equal chest rise and unlabored breathing, normal cough   Heart:   No visualized JVD   Abdomen:   Soft, non-tender, non-distended; no masses, no " organomegaly    Genitalia:   Deferred    Rectal:   Deferred    Extremities:  No cyanosis, clubbing or edema    Pulses:  Musculoskeletal:  2+ and symmetric  Normal range of motion visualized    Skin:  Neuro:  No jaundice, rashes, or lesions   Alert and appropriate       Lab Results:   No visits with results within 1 Day(s) from this visit  Latest known visit with results is:   Hospital Outpatient Visit on 03/31/2023   Component Date Value   • LA size 03/31/2023 3 2    • Triscuspid Valve Regurgi* 03/31/2023 21 0    • Tricuspid valve peak reg* 03/31/2023 2 28    • LVPWd 03/31/2023 1 20    • Left Atrium Area-systoli* 03/31/2023 17 9    • Left Atrium Area-systoli* 03/31/2023 17 4    • MV E' Tissue Velocity Se* 03/31/2023 9    • RA 2D Volume 03/31/2023 29 0    • Tricuspid annular plane * 03/31/2023 2 10    • TR Peak Tony 03/31/2023 2 3    • IVSd 03/31/2023 9 84    • LV DIASTOLIC VOLUME (MOD* 50/32/9180 116    • LEFT VENTRICLE SYSTOLIC * 28/75/9529 37    • Left ventricular stroke * 03/31/2023 79 00    • A4C EF 03/31/2023 62    • LA length (A2C) 03/31/2023 5 10    • LVIDd 03/31/2023 5 00    • IVS 03/31/2023 1 2    • LVIDS 03/31/2023 3 10    • FS 03/31/2023 38    • Ao root 03/31/2023 3 50    • RVID d 03/31/2023 3 3    • MV valve area p 1/2 meth* 03/31/2023 4 23    • E wave deceleration time 03/31/2023 178    • MV Peak E Tony 03/31/2023 81    • MV Peak A Tony 03/31/2023 0 54    • RAA A4C 03/31/2023 13 3    • MV stenosis pressure 1/2* 03/31/2023 52    • LVSV, 2D 03/31/2023 79    • LV EF 03/31/2023 60    • Est  RA pres 03/31/2023 5 0    • Right Ventricular Peak S* 03/31/2023 26 00          Radiology Results:   No results found

## 2023-06-01 NOTE — PROGRESS NOTES
[facility] - Outpatient Consultation  Darius Salinas 57 y o  male MRN: [id number]  Encounter: [id number]          ASSESSMENT AND PLAN:    ***  1  Screen for colon cancer  ***  - Ambulatory Referral to Gastroenterology    2  Gastroesophageal reflux disease, unspecified whether esophagitis present  ***  - Ambulatory Referral to Gastroenterology    3  Frequent bowel movements  ***  - Ambulatory Referral to Gastroenterology      ***  The rationale for endoscopy with possible biopsy, possible polypectomy, with IV sedation as well as all risks, benefits, and alternatives were discussed with the patient in detail  Risks including but not limited to perforation, bleeding, need for blood transfusion or emergent surgery, and missed neoplasm were reviewed in detail with the patient  The patient demonstrates full understanding and wishes to proceed  ______________________________________________________________    HPI:  Darius Salinas is a 57y o  year old male who presents to the office for evaluation of ***    They have a past medical history of *** and follows with their PCP ROSE Pelaoy    REVIEW OF SYSTEMS:    CONSTITUTIONAL: Denies any fever, chills, rigors, and weight loss  HEENT: No earache or tinnitus  Denies hearing loss or visual disturbances  CARDIOVASCULAR: No chest pain or palpitations  RESPIRATORY: Denies any cough, hemoptysis, shortness of breath or dyspnea on exertion  GASTROINTESTINAL: As noted in the History of Present Illness  GENITOURINARY: No problems with urination  Denies any hematuria or dysuria  NEUROLOGIC: No dizziness or vertigo, denies headaches  MUSCULOSKELETAL: Denies any muscle or joint pain  SKIN: Denies skin rashes or itching  ENDOCRINE: Denies excessive thirst  Denies intolerance to heat or cold  PSYCHOSOCIAL: Denies depression or anxiety  Denies any recent memory loss         Historical Information   Past Medical History:   Diagnosis "Date   • Bronchitis    • COVID      Past Surgical History:   Procedure Laterality Date   • HERNIA REPAIR     • WISDOM TOOTH EXTRACTION Bilateral 2002     Social History   Social History     Substance and Sexual Activity   Alcohol Use Not Currently    Comment: socially     Social History     Substance and Sexual Activity   Drug Use Never     Social History     Tobacco Use   Smoking Status Never   • Passive exposure: Past   Smokeless Tobacco Never     Family History   Problem Relation Age of Onset   • Diabetes Mother    • No Known Problems Father    • No Known Problems Brother    • Colon cancer Paternal Grandfather    • Diabetes Maternal Aunt    • Diabetes Maternal Uncle    • Diabetes Paternal Aunt    • Diabetes Paternal Uncle        Meds/Allergies       Current Outpatient Medications:   •  amLODIPine (NORVASC) 2 5 mg tablet  •  Cholecalciferol (Vitamin D) 50 MCG (2000 UT) tablet  •  clobetasol (TEMOVATE) 0 05 % cream  •  famotidine (PEPCID) 20 mg tablet  •  gemfibrozil (LOPID) 600 mg tablet  •  tamsulosin (FLOMAX) 0 4 mg    Allergies   Allergen Reactions   • Penicillins Rash           Objective     Blood pressure 118/76, temperature 98 7 °F (37 1 °C), temperature source Tympanic, height 5' 7\" (1 702 m), weight 104 kg (228 lb 9 6 oz)  Body mass index is 35 8 kg/m²  PHYSICAL EXAM:      General Appearance:   Alert, cooperative, no distress   HEENT:   Normocephalic, atraumatic, anicteric  Lungs:   Equal chest rise and unlabored breathing, normal cough   Heart:   No visualized JVD   Abdomen:   Soft, non-tender, non-distended; no masses, no organomegaly    Genitalia:   Deferred    Rectal:   Deferred    Extremities:  No cyanosis, clubbing or edema    Pulses:  Musculoskeletal:  2+ and symmetric  Normal range of motion visualized    Skin:  Neuro:  No jaundice, rashes, or lesions   Alert and appropriate       Lab Results:   No visits with results within 1 Day(s) from this visit     Latest known visit with results " is:   Hospital Outpatient Visit on 03/31/2023   Component Date Value   • LA size 03/31/2023 3 2    • Triscuspid Valve Regurgi* 03/31/2023 21 0    • Tricuspid valve peak reg* 03/31/2023 2 28    • LVPWd 03/31/2023 1 20    • Left Atrium Area-systoli* 03/31/2023 17 9    • Left Atrium Area-systoli* 03/31/2023 17 4    • MV E' Tissue Velocity Se* 03/31/2023 9    • RA 2D Volume 03/31/2023 29 0    • Tricuspid annular plane * 03/31/2023 2 10    • TR Peak Tony 03/31/2023 2 3    • IVSd 03/31/2023 8 51    • LV DIASTOLIC VOLUME (MOD* 09/81/3870 116    • LEFT VENTRICLE SYSTOLIC * 69/71/3633 37    • Left ventricular stroke * 03/31/2023 79 00    • A4C EF 03/31/2023 62    • LA length (A2C) 03/31/2023 5 10    • LVIDd 03/31/2023 5 00    • IVS 03/31/2023 1 2    • LVIDS 03/31/2023 3 10    • FS 03/31/2023 38    • Ao root 03/31/2023 3 50    • RVID d 03/31/2023 3 3    • MV valve area p 1/2 meth* 03/31/2023 4 23    • E wave deceleration time 03/31/2023 178    • MV Peak E Tony 03/31/2023 81    • MV Peak A Tony 03/31/2023 0 54    • RAA A4C 03/31/2023 13 3    • MV stenosis pressure 1/2* 03/31/2023 52    • LVSV, 2D 03/31/2023 79    • LV EF 03/31/2023 60    • Est  RA pres 03/31/2023 5 0    • Right Ventricular Peak S* 03/31/2023 26 00          Radiology Results:   No results found

## 2023-06-02 ENCOUNTER — TELEPHONE (OUTPATIENT)
Dept: GASTROENTEROLOGY | Facility: CLINIC | Age: 48
End: 2023-06-02

## 2023-06-02 NOTE — TELEPHONE ENCOUNTER
Scheduled date of EGD/colonoscopy (as of today):07/06/2023  Physician performing  EGD/colonoscopy:Víctor  Location of EGD/colonoscopy: [facility]  Desired bowel prep reviewed with patient:Kady Alexandre  Instructions reviewed with patient by: Nai Vargas  Clearances:  N/A

## 2023-06-04 ENCOUNTER — HOSPITAL ENCOUNTER (EMERGENCY)
Facility: HOSPITAL | Age: 48
Discharge: HOME | End: 2023-06-04
Attending: FAMILY MEDICINE | Admitting: FAMILY MEDICINE
Payer: COMMERCIAL

## 2023-06-04 ENCOUNTER — APPOINTMENT (EMERGENCY)
Dept: CT IMAGING | Facility: HOSPITAL | Age: 48
End: 2023-06-04
Payer: COMMERCIAL

## 2023-06-04 VITALS
DIASTOLIC BLOOD PRESSURE: 67 MMHG | WEIGHT: 225 LBS | OXYGEN SATURATION: 94 % | HEART RATE: 85 BPM | RESPIRATION RATE: 18 BRPM | SYSTOLIC BLOOD PRESSURE: 141 MMHG | BODY MASS INDEX: 34.1 KG/M2 | TEMPERATURE: 98.5 F | HEIGHT: 68 IN

## 2023-06-04 DIAGNOSIS — R10.9 ABDOMINAL PAIN: Primary | ICD-10-CM

## 2023-06-04 DIAGNOSIS — K57.32 SIGMOID DIVERTICULITIS: ICD-10-CM

## 2023-06-04 LAB
ALBUMIN SERPL BCP-MCNC: 4.6 G/DL (ref 3.5–5)
ALP SERPL-CCNC: 65 U/L (ref 34–104)
ALT SERPL W P-5'-P-CCNC: 36 U/L (ref 7–52)
ANION GAP SERPL CALCULATED.3IONS-SCNC: 9 MMOL/L (ref 4–13)
AST SERPL W P-5'-P-CCNC: 14 U/L (ref 13–39)
BASOPHILS # BLD AUTO: 0.02 THOUSANDS/ÂΜL (ref 0–0.1)
BASOPHILS NFR BLD AUTO: 0 % (ref 0–1)
BILIRUB SERPL-MCNC: 0.78 MG/DL (ref 0.2–1)
BUN SERPL-MCNC: 14 MG/DL (ref 5–25)
CALCIUM SERPL-MCNC: 9.6 MG/DL (ref 8.4–10.2)
CHLORIDE SERPL-SCNC: 102 MMOL/L (ref 96–108)
CO2 SERPL-SCNC: 29 MMOL/L (ref 21–32)
CREAT SERPL-MCNC: 1.01 MG/DL (ref 0.6–1.3)
EOSINOPHIL # BLD AUTO: 0.16 THOUSAND/ÂΜL (ref 0–0.61)
EOSINOPHIL NFR BLD AUTO: 2 % (ref 0–6)
ERYTHROCYTE [DISTWIDTH] IN BLOOD BY AUTOMATED COUNT: 12.1 % (ref 11.6–15.1)
FLUAV RNA RESP QL NAA+PROBE: NEGATIVE
FLUBV RNA RESP QL NAA+PROBE: NEGATIVE
GFR SERPL CREATININE-BSD FRML MDRD: 88 ML/MIN/1.73SQ M
GLUCOSE SERPL-MCNC: 118 MG/DL (ref 65–140)
HCT VFR BLD AUTO: 44 % (ref 36.5–49.3)
HGB BLD-MCNC: 15 G/DL (ref 12–17)
IMM GRANULOCYTES # BLD AUTO: 0.08 THOUSAND/UL (ref 0–0.2)
IMM GRANULOCYTES NFR BLD AUTO: 1 % (ref 0–2)
LIPASE SERPL-CCNC: 17 U/L (ref 11–82)
LYMPHOCYTES # BLD AUTO: 1.92 THOUSANDS/ÂΜL (ref 0.6–4.47)
LYMPHOCYTES NFR BLD AUTO: 17 % (ref 14–44)
MAGNESIUM SERPL-MCNC: 2 MG/DL (ref 1.9–2.7)
MCH RBC QN AUTO: 30.7 PG (ref 26.8–34.3)
MCHC RBC AUTO-ENTMCNC: 34.1 G/DL (ref 31.4–37.4)
MCV RBC AUTO: 90 FL (ref 82–98)
MONOCYTES # BLD AUTO: 1.05 THOUSAND/ÂΜL (ref 0.17–1.22)
MONOCYTES NFR BLD AUTO: 10 % (ref 4–12)
NEUTROPHILS # BLD AUTO: 7.78 THOUSANDS/ÂΜL (ref 1.85–7.62)
NEUTS SEG NFR BLD AUTO: 70 % (ref 43–75)
NRBC BLD AUTO-RTO: 0 /100 WBCS
PLATELET # BLD AUTO: 257 THOUSANDS/UL (ref 149–390)
PMV BLD AUTO: 9.3 FL (ref 8.9–12.7)
POTASSIUM SERPL-SCNC: 3.9 MMOL/L (ref 3.5–5.3)
PROT SERPL-MCNC: 7.7 G/DL (ref 6.4–8.4)
RBC # BLD AUTO: 4.88 MILLION/UL (ref 3.88–5.62)
RSV RNA RESP QL NAA+PROBE: NEGATIVE
SARS-COV-2 RNA RESP QL NAA+PROBE: NEGATIVE
SODIUM SERPL-SCNC: 140 MMOL/L (ref 135–147)
WBC # BLD AUTO: 11.01 THOUSAND/UL (ref 4.31–10.16)

## 2023-06-04 PROCEDURE — 0241U HB NFCT DS VIR RESP RNA 4 TRGT: CPT | Performed by: FAMILY MEDICINE

## 2023-06-04 PROCEDURE — 85025 COMPLETE CBC W/AUTO DIFF WBC: CPT | Performed by: FAMILY MEDICINE

## 2023-06-04 PROCEDURE — 36415 COLL VENOUS BLD VENIPUNCTURE: CPT | Performed by: FAMILY MEDICINE

## 2023-06-04 PROCEDURE — G1004 CDSM NDSC: HCPCS

## 2023-06-04 PROCEDURE — 96374 THER/PROPH/DIAG INJ IV PUSH: CPT

## 2023-06-04 PROCEDURE — 80053 COMPREHEN METABOLIC PANEL: CPT | Performed by: FAMILY MEDICINE

## 2023-06-04 PROCEDURE — 83735 ASSAY OF MAGNESIUM: CPT | Performed by: FAMILY MEDICINE

## 2023-06-04 PROCEDURE — 99284 EMERGENCY DEPT VISIT MOD MDM: CPT

## 2023-06-04 PROCEDURE — 74177 CT ABD & PELVIS W/CONTRAST: CPT

## 2023-06-04 PROCEDURE — 83690 ASSAY OF LIPASE: CPT | Performed by: FAMILY MEDICINE

## 2023-06-04 RX ORDER — METRONIDAZOLE 500 MG/1
500 TABLET ORAL ONCE
Status: COMPLETED | OUTPATIENT
Start: 2023-06-04 | End: 2023-06-04

## 2023-06-04 RX ORDER — ONDANSETRON 2 MG/ML
4 INJECTION INTRAMUSCULAR; INTRAVENOUS ONCE
Status: COMPLETED | OUTPATIENT
Start: 2023-06-04 | End: 2023-06-04

## 2023-06-04 RX ORDER — CIPROFLOXACIN 500 MG/1
500 TABLET, FILM COATED ORAL 2 TIMES DAILY
Qty: 14 TABLET | Refills: 0 | Status: SHIPPED | OUTPATIENT
Start: 2023-06-04 | End: 2023-06-11

## 2023-06-04 RX ORDER — CIPROFLOXACIN 500 MG/1
500 TABLET, FILM COATED ORAL ONCE
Status: COMPLETED | OUTPATIENT
Start: 2023-06-04 | End: 2023-06-04

## 2023-06-04 RX ORDER — METRONIDAZOLE 500 MG/1
500 TABLET ORAL EVERY 8 HOURS SCHEDULED
Qty: 21 TABLET | Refills: 0 | Status: SHIPPED | OUTPATIENT
Start: 2023-06-04 | End: 2023-06-11

## 2023-06-04 RX ADMIN — IOHEXOL 100 ML: 350 INJECTION, SOLUTION INTRAVENOUS at 15:38

## 2023-06-04 RX ADMIN — CIPROFLOXACIN 500 MG: 500 TABLET, FILM COATED ORAL at 17:25

## 2023-06-04 RX ADMIN — METRONIDAZOLE 500 MG: 500 TABLET ORAL at 17:25

## 2023-06-04 RX ADMIN — ONDANSETRON 4 MG: 2 INJECTION INTRAMUSCULAR; INTRAVENOUS at 14:46

## 2023-06-04 NOTE — ED PROVIDER NOTES
History  Chief Complaint   Patient presents with   • Constipation     Complains of constipation for 2-3 days  Has taken fiber pills/ stool softeners  Nausea x 1 day  Coloscopy scheduled/ seen GI doctor  Last BM was 15 minutes ago ( hard to go)   Hx hernias  • Abdominal Pain     Patient reports pain being all over abdomen  Patient reports generalized body aches      HPI  74-year-old male presented to ED with complaint of abdominal pain that started on Friday  Patient states that he initially thought he had a stomach flu had some nausea and diarrhea and a generalized body ache  He states that he felt better today but ate a steak lunch and started with abdominal pain about an hour prior to arrival   Rating his pain 5 out of 10  Does complain of some nausea  He states that he had a bowel movement but today he felt like that he did not empty completely  Denies any chest pain or shortness of breath  Prior to Admission Medications   Prescriptions Last Dose Informant Patient Reported? Taking?    Cholecalciferol (Vitamin D) 50 MCG (2000 UT) tablet  Self Yes No   Sig: Take 2 tablets by mouth daily   amLODIPine (NORVASC) 2 5 mg tablet  Self No No   Sig: Take 1 tablet (2 5 mg total) by mouth daily   bisacodyl (DULCOLAX) 5 mg EC tablet   No No   Sig: Take 1 tablet (5 mg total) by mouth once for 1 dose   clobetasol (TEMOVATE) 0 05 % cream  Self Yes No   Sig: Apply topically 2 (two) times a day   famotidine (PEPCID) 20 mg tablet  Self No No   Sig: Take 1 tablet (20 mg total) by mouth 2 (two) times a day   gemfibrozil (LOPID) 600 mg tablet  Self Yes No   Sig: Take by mouth 2 (two) times a day   polyethylene glycol (GOLYTELY) 4000 mL solution   No No   Sig: Take 4,000 mL by mouth once for 1 dose   tamsulosin (FLOMAX) 0 4 mg  Self Yes No   Sig: Take 1 capsule by mouth daily at bedtime   Patient not taking: Reported on 5/12/2023      Facility-Administered Medications: None       Past Medical History:   Diagnosis Date   • Bronchitis    • COVID        Past Surgical History:   Procedure Laterality Date   • HERNIA REPAIR     • WISDOM TOOTH EXTRACTION Bilateral 2002       Family History   Problem Relation Age of Onset   • Diabetes Mother    • No Known Problems Father    • No Known Problems Brother    • Colon cancer Paternal Grandfather    • Diabetes Maternal Aunt    • Diabetes Maternal Uncle    • Diabetes Paternal Aunt    • Diabetes Paternal Uncle      I have reviewed and agree with the history as documented  E-Cigarette/Vaping   • E-Cigarette Use Never User      E-Cigarette/Vaping Substances     Social History     Tobacco Use   • Smoking status: Never     Passive exposure: Past   • Smokeless tobacco: Never   Vaping Use   • Vaping Use: Never used   Substance Use Topics   • Alcohol use: Not Currently     Comment: socially   • Drug use: Never       Review of Systems   Constitutional: Negative  HENT: Negative  Eyes: Negative  Respiratory: Negative  Cardiovascular: Negative  Gastrointestinal: Positive for abdominal pain, constipation and diarrhea  Endocrine: Negative  Genitourinary: Negative  Musculoskeletal: Negative  Skin: Negative  Allergic/Immunologic: Negative  Neurological: Negative  Hematological: Negative  Psychiatric/Behavioral: Negative  Physical Exam  Physical Exam  Vitals and nursing note reviewed  Constitutional:       General: He is not in acute distress  Appearance: He is well-developed  He is not diaphoretic  HENT:      Head: Normocephalic and atraumatic  Right Ear: External ear normal       Left Ear: External ear normal       Nose: Nose normal    Eyes:      Conjunctiva/sclera: Conjunctivae normal       Pupils: Pupils are equal, round, and reactive to light  Cardiovascular:      Rate and Rhythm: Normal rate and regular rhythm  Pulmonary:      Effort: Pulmonary effort is normal  No respiratory distress  Breath sounds: Normal breath sounds  No wheezing  Abdominal:      General: There is no distension  Tenderness: There is generalized abdominal tenderness  Musculoskeletal:         General: Normal range of motion  Cervical back: Normal range of motion and neck supple  Lymphadenopathy:      Cervical: No cervical adenopathy  Skin:     General: Skin is warm and dry  Capillary Refill: Capillary refill takes less than 2 seconds  Neurological:      General: No focal deficit present  Mental Status: He is alert and oriented to person, place, and time     Psychiatric:         Behavior: Behavior normal          Vital Signs  ED Triage Vitals [06/04/23 1436]   Temperature Pulse Respirations Blood Pressure SpO2   98 °F (36 7 °C) 88 18 (!) 178/88 96 %      Temp Source Heart Rate Source Patient Position - Orthostatic VS BP Location FiO2 (%)   Temporal Monitor Lying Right arm --      Pain Score       5           Vitals:    06/04/23 1436 06/04/23 1500 06/04/23 1600 06/04/23 1630   BP: (!) 178/88 159/84     Pulse: 88 79 83 85   Patient Position - Orthostatic VS: Lying            Visual Acuity      ED Medications  Medications   ciprofloxacin (CIPRO) tablet 500 mg (has no administration in time range)   metroNIDAZOLE (FLAGYL) tablet 500 mg (has no administration in time range)   ondansetron (ZOFRAN) injection 4 mg (4 mg Intravenous Given 6/4/23 1446)   iohexol (OMNIPAQUE) 350 MG/ML injection (SINGLE-DOSE) 100 mL (100 mL Intravenous Given 6/4/23 1538)       Diagnostic Studies  Results Reviewed     Procedure Component Value Units Date/Time    FLU/RSV/COVID - if FLU/RSV clinically relevant [[id number]]  (Normal) Collected: 06/04/23 1443    Lab Status: Final result Specimen: Nares from Nose Updated: 06/04/23 1540     SARS-CoV-2 Negative     INFLUENZA A PCR Negative     INFLUENZA B PCR Negative     RSV PCR Negative    Narrative:      FOR PEDIATRIC PATIENTS - copy/paste COVID Guidelines URL to browser: [email or web address]    SARS-CoV-2 assay is a Nucleic Acid Amplification assay intended for the  qualitative detection of nucleic acid from SARS-CoV-2 in nasopharyngeal  swabs  Results are for the presumptive identification of SARS-CoV-2 RNA  Positive results are indicative of infection with SARS-CoV-2, the virus  causing COVID-19, but do not rule out bacterial infection or co-infection  with other viruses  Laboratories within the [place] and its  territories are required to report all positive results to the appropriate  public health authorities  Negative results do not preclude SARS-CoV-2  infection and should not be used as the sole basis for treatment or other  patient management decisions  Negative results must be combined with  clinical observations, patient history, and epidemiological information  This test has not been FDA cleared or approved  This test has been authorized by FDA under an Emergency Use Authorization  (EUA)  This test is only authorized for the duration of time the  declaration that circumstances exist justifying the authorization of the  emergency use of an in vitro diagnostic tests for detection of SARS-CoV-2  virus and/or diagnosis of COVID-19 infection under section 564(b)(1) of  the Act, 21 U  S C  075TGF-4(I)(9), unless the authorization is terminated  or revoked sooner  The test has been validated but independent review by FDA  and CLIA is pending  Test performed using [employer] GeneXpert: This RT-PCR assay targets N2,  a region unique to SARS-CoV-2  A conserved region in the E-gene was chosen  for pan-Sarbecovirus detection which includes SARS-CoV-2  According to CMS-2020-01-R, this platform meets the definition of high-throughput technology      [facility] [[id number]] Collected: 06/04/23 1443    Lab Status: Final result Specimen: Blood from Arm, Left Updated: 06/04/23 1517     Sodium 140 mmol/L      Potassium 3 9 mmol/L Chloride 102 mmol/L      CO2 29 mmol/L      ANION GAP 9 mmol/L      BUN 14 mg/dL      Creatinine 1 01 mg/dL      Glucose 118 mg/dL      Calcium 9 6 mg/dL      AST 14 U/L      ALT 36 U/L      Alkaline Phosphatase 65 U/L      Total Protein 7 7 g/dL      Albumin 4 6 g/dL      Total Bilirubin 0 78 mg/dL      eGFR 88 ml/min/1 73sq m     Narrative:      National Kidney Disease Foundation guidelines for Chronic Kidney Disease (CKD):   •  Stage 1 with normal or high GFR (GFR > 90 mL/min/1 73 square meters)  •  Stage 2 Mild CKD (GFR = 60-89 mL/min/1 73 square meters)  •  Stage 3A Moderate CKD (GFR = 45-59 mL/min/1 73 square meters)  •  Stage 3B Moderate CKD (GFR = 30-44 mL/min/1 73 square meters)  •  Stage 4 Severe CKD (GFR = 15-29 mL/min/1 73 square meters)  •  Stage 5 End Stage CKD (GFR <15 mL/min/1 73 square meters)  Note: GFR calculation is accurate only with a steady state creatinine    Lipase [[id number]]  (Normal) Collected: 06/04/23 1443    Lab Status: Final result Specimen: Blood from Arm, Left Updated: 06/04/23 1517     Lipase 17 u/L     Magnesium [[id number]]  (Normal) Collected: 06/04/23 1443    Lab Status: Final result Specimen: Blood from Arm, Left Updated: 06/04/23 1517     Magnesium 2 0 mg/dL     CBC and differential [[id number]]  (Abnormal) Collected: 06/04/23 1443    Lab Status: Final result Specimen: Blood from Arm, Left Updated: 06/04/23 1451     WBC 11 01 Thousand/uL      RBC 4 88 Million/uL      Hemoglobin 15 0 g/dL      Hematocrit 44 0 %      MCV 90 fL      MCH 30 7 pg      MCHC 34 1 g/dL      RDW 12 1 %      MPV 9 3 fL      Platelets 054 Thousands/uL      nRBC 0 /100 WBCs      Neutrophils Relative 70 %      Immat GRANS % 1 %      Lymphocytes Relative 17 %      Monocytes Relative 10 %      Eosinophils Relative 2 %      Basophils Relative 0 %      Neutrophils Absolute 7 78 Thousands/µL      Immature Grans Absolute 0 08 Thousand/uL      Lymphocytes Absolute 1 92 Thousands/µL      Monocytes Absolute 1 05 Thousand/µL      Eosinophils Absolute 0 16 Thousand/µL      Basophils Absolute 0 02 Thousands/µL                  CT Abdomen pelvis with contrast   Final Result by Otis Foster MD (06/04 0355)   Acute uncomplicated sigmoid diverticulitis  Workstation performed: [id number]                    Procedures  Procedures         ED Course  ED Course as of 06/04/23 1719   Sun Jun 04, 2023   0774 Acute uncomplicated sigmoid diverticulitis  SBIRT 22yo+    Flowsheet Row Most Recent Value   Initial Alcohol Screen: US AUDIT-C     1  How often do you have a drink containing alcohol? 0 Filed at: 06/04/2023 1438   2  How many drinks containing alcohol do you have on a typical day you are drinking? 0 Filed at: 06/04/2023 1438   3a  Male UNDER 65: How often do you have five or more drinks on one occasion? 0 Filed at: 06/04/2023 1438   3b  FEMALE Any Age, or MALE 65+: How often do you have 4 or more drinks on one occassion? 0 Filed at: 06/04/2023 1438   Audit-C Score 0 Filed at: 06/04/2023 1438   MAGDA: How many times in the past year have you    Used an illegal drug or used a prescription medication for non-medical reasons? Never Filed at: 06/04/2023 1438                    Medical Decision Making  24-year-old male patient with history as above presented with abdominal pain  History obtained from patient and his wife who is by bedside    Patient was nontoxic, stable  Ambulatory  Exam as above  Will obtain labs and a CT abdomen pelvis with contrast     Labs reviewed  Independently reviewed imaging  Reviewed external records  Differential diagnosis considered  Overall presentation is consistent with acute sigmoid diverticulitis  Low suspicion for cholecystitis, appendicitis, SBO, AAA rupture, or other serious pathology  Patient was treated with pain medications with improvement in symptoms       Consideration was given for admission, but the patient was stable for outpatient management  Disposition: Discussed need to follow up diagnostics, including incidental findings  Discharged with instructions to obtain outpatient follow up of patient's symptoms and findings, with strict return precautions if patient develops new or worsening symptoms  She was also given referral to follow-up with the general surgery  History of precaution regarding her return if I start having any worsening pain fever chills nausea vomiting return back to the ED  Patient verbalized understand the plan discharge home  Amount and/or Complexity of Data Reviewed  Labs: ordered  Radiology: ordered  Risk  Prescription drug management  Disposition  Final diagnoses:   Abdominal pain   Sigmoid diverticulitis     Time reflects when diagnosis was documented in both MDM as applicable and the Disposition within this note     Time User Action Codes Description Comment    6/4/2023  5:14 PM Papito Barton [R10 [id number]] Abdominal pain     6/4/2023  5:14 PM Grazyna Toledo [K57 [id number]] Sigmoid diverticulitis       ED Disposition     ED Disposition   Discharge    Condition   Stable    Date/Time   Sun Jun 4, 2023  5:14 PM    Comment   Zane Arana discharge to home/self care                 Follow-up Information     Follow up With Specialties Details Why [facility], [facility] Internal Medicine, Nurse Practitioner, Family Medicine Schedule an appointment as soon as possible for a visit in 2 days If symptoms worsen [address]  [address]      Isidro Adamson MD General Surgery, Surgical Oncology Schedule an appointment as soon as possible for a visit in 2 days If symptoms worsen [address]  [phone]            Patient's Medications   Discharge Prescriptions    CIPROFLOXACIN (CIPRO) 500 MG TABLET    Take 1 tablet (500 mg total) by mouth 2 (two) times a day for 7 days       Start Date: 6/4/2023  End Date: 6/11/2023 Order Dose: 500 mg       Quantity: 14 tablet    Refills: 0    METRONIDAZOLE (FLAGYL) 500 MG TABLET    Take 1 tablet (500 mg total) by mouth every 8 (eight) hours for 7 days       Start Date: 6/4/2023  End Date: 6/11/2023       Order Dose: 500 mg       Quantity: 21 tablet    Refills: 0       No discharge procedures on file      PDMP Review     None          ED Provider  Electronically Signed by           Sj King MD  06/04/23 [phone]

## 2023-06-05 ENCOUNTER — TELEPHONE (OUTPATIENT)
Dept: OTHER | Facility: OTHER | Age: 48
End: 2023-06-05

## 2023-06-06 NOTE — TELEPHONE ENCOUNTER
I returned call to patient he wanted Dr Rebecca Porter aware that he was in Er and is now on an antibiotic  I advised we will send Dr Rebecca Porter an Uli Dues and we will keep his procedures as scheduled for July

## 2023-06-22 ENCOUNTER — APPOINTMENT (OUTPATIENT)
Dept: LAB | Facility: CLINIC | Age: 48
End: 2023-06-22
Payer: COMMERCIAL

## 2023-06-22 DIAGNOSIS — R74.8 ELEVATED LIVER ENZYMES: ICD-10-CM

## 2023-06-22 LAB
ALBUMIN SERPL BCP-MCNC: 4.3 G/DL (ref 3.5–5)
ALP SERPL-CCNC: 67 U/L (ref 34–104)
ALT SERPL W P-5'-P-CCNC: 33 U/L (ref 7–52)
AST SERPL W P-5'-P-CCNC: 18 U/L (ref 13–39)
BILIRUB DIRECT SERPL-MCNC: 0.09 MG/DL (ref 0–0.2)
BILIRUB SERPL-MCNC: 0.33 MG/DL (ref 0.2–1)
PROT SERPL-MCNC: 7.3 G/DL (ref 6.4–8.4)

## 2023-06-22 PROCEDURE — 36415 COLL VENOUS BLD VENIPUNCTURE: CPT

## 2023-06-22 PROCEDURE — 80076 HEPATIC FUNCTION PANEL: CPT

## 2023-07-06 ENCOUNTER — HOSPITAL ENCOUNTER (OUTPATIENT)
Dept: GASTROENTEROLOGY | Facility: HOSPITAL | Age: 48
Setting detail: OUTPATIENT SURGERY
End: 2023-07-06
Payer: COMMERCIAL

## 2023-07-06 ENCOUNTER — ANESTHESIA (OUTPATIENT)
Dept: GASTROENTEROLOGY | Facility: HOSPITAL | Age: 48
End: 2023-07-06

## 2023-07-06 ENCOUNTER — ANESTHESIA EVENT (OUTPATIENT)
Dept: GASTROENTEROLOGY | Facility: HOSPITAL | Age: 48
End: 2023-07-06

## 2023-07-06 VITALS
HEIGHT: 67 IN | WEIGHT: 218 LBS | OXYGEN SATURATION: 96 % | SYSTOLIC BLOOD PRESSURE: 114 MMHG | HEART RATE: 76 BPM | RESPIRATION RATE: 18 BRPM | BODY MASS INDEX: 34.21 KG/M2 | TEMPERATURE: 97.2 F | DIASTOLIC BLOOD PRESSURE: 70 MMHG

## 2023-07-06 DIAGNOSIS — R19.4 FREQUENT BOWEL MOVEMENTS: ICD-10-CM

## 2023-07-06 DIAGNOSIS — Z12.11 SCREEN FOR COLON CANCER: ICD-10-CM

## 2023-07-06 DIAGNOSIS — K21.9 GASTROESOPHAGEAL REFLUX DISEASE, UNSPECIFIED WHETHER ESOPHAGITIS PRESENT: ICD-10-CM

## 2023-07-06 PROCEDURE — 43239 EGD BIOPSY SINGLE/MULTIPLE: CPT | Performed by: INTERNAL MEDICINE

## 2023-07-06 PROCEDURE — 45380 COLONOSCOPY AND BIOPSY: CPT | Performed by: INTERNAL MEDICINE

## 2023-07-06 PROCEDURE — 88305 TISSUE EXAM BY PATHOLOGIST: CPT | Performed by: PATHOLOGY

## 2023-07-06 PROCEDURE — 45385 COLONOSCOPY W/LESION REMOVAL: CPT | Performed by: INTERNAL MEDICINE

## 2023-07-06 RX ORDER — LIDOCAINE HYDROCHLORIDE 20 MG/ML
INJECTION, SOLUTION EPIDURAL; INFILTRATION; INTRACAUDAL; PERINEURAL AS NEEDED
Status: DISCONTINUED | OUTPATIENT
Start: 2023-07-06 | End: 2023-07-06

## 2023-07-06 RX ORDER — PROPOFOL 10 MG/ML
INJECTION, EMULSION INTRAVENOUS AS NEEDED
Status: DISCONTINUED | OUTPATIENT
Start: 2023-07-06 | End: 2023-07-06

## 2023-07-06 RX ORDER — SODIUM CHLORIDE, SODIUM LACTATE, POTASSIUM CHLORIDE, CALCIUM CHLORIDE 600; 310; 30; 20 MG/100ML; MG/100ML; MG/100ML; MG/100ML
100 INJECTION, SOLUTION INTRAVENOUS CONTINUOUS
Status: DISCONTINUED | OUTPATIENT
Start: 2023-07-06 | End: 2023-07-10 | Stop reason: HOSPADM

## 2023-07-06 RX ORDER — PROPOFOL 10 MG/ML
INJECTION, EMULSION INTRAVENOUS CONTINUOUS PRN
Status: DISCONTINUED | OUTPATIENT
Start: 2023-07-06 | End: 2023-07-06

## 2023-07-06 RX ORDER — GLYCOPYRROLATE 0.2 MG/ML
INJECTION INTRAMUSCULAR; INTRAVENOUS AS NEEDED
Status: DISCONTINUED | OUTPATIENT
Start: 2023-07-06 | End: 2023-07-06

## 2023-07-06 RX ADMIN — PROPOFOL 30 MG: 10 INJECTION, EMULSION INTRAVENOUS at 08:00

## 2023-07-06 RX ADMIN — SODIUM CHLORIDE, SODIUM LACTATE, POTASSIUM CHLORIDE, AND CALCIUM CHLORIDE: .6; .31; .03; .02 INJECTION, SOLUTION INTRAVENOUS at 07:39

## 2023-07-06 RX ADMIN — LIDOCAINE HYDROCHLORIDE 100 MG: 20 INJECTION, SOLUTION EPIDURAL; INFILTRATION; INTRACAUDAL; PERINEURAL at 07:56

## 2023-07-06 RX ADMIN — PROPOFOL 140 MCG/KG/MIN: 10 INJECTION, EMULSION INTRAVENOUS at 07:56

## 2023-07-06 RX ADMIN — GLYCOPYRROLATE 0.2 MG: 0.2 INJECTION, SOLUTION INTRAMUSCULAR; INTRAVENOUS at 07:56

## 2023-07-06 RX ADMIN — PROPOFOL 100 MG: 10 INJECTION, EMULSION INTRAVENOUS at 07:57

## 2023-07-06 NOTE — ANESTHESIA POSTPROCEDURE EVALUATION
Post-Op Assessment Note    CV Status:  Stable  Pain Score: 0    Pain management: adequate     Mental Status:  Arousable   Hydration Status:  Stable   PONV Controlled:  None   Airway Patency:  Patent      Post Op Vitals Reviewed: Yes      Staff: CRNA         No notable events documented.     BP   146/89   Temp     Pulse  80   Resp   15   SpO2   99%

## 2023-07-06 NOTE — ANESTHESIA PREPROCEDURE EVALUATION
Procedure:  COLONOSCOPY  EGD    Relevant Problems   CARDIO   (+) PIERCE (dyspnea on exertion)   (+) Mixed hyperlipidemia   (+) Primary hypertension      GI/HEPATIC   (+) Gastroesophageal reflux disease      PULMONARY   (+) PIERCE (dyspnea on exertion)        Physical Exam    Airway    Mallampati score: III  TM Distance: >3 FB  Neck ROM: full     Dental   No notable dental hx     Cardiovascular  Cardiovascular exam normal    Pulmonary  Pulmonary exam normal     Other Findings        Anesthesia Plan  ASA Score- 2     Anesthesia Type- IV sedation with anesthesia with ASA Monitors. Additional Monitors:   Airway Plan:           Plan Factors-Exercise tolerance (METS): >4 METS. Chart reviewed. Patient summary reviewed. Patient is not a current smoker. Induction- intravenous. Postoperative Plan-     Informed Consent- Anesthetic plan and risks discussed with patient.

## 2023-07-06 NOTE — H&P
History and Physical -  [facility]  Maxim Caban 57 y.o. male MRN: [id number]                  HPI: Maxim Caban is a 57y.o. year old male who presents for EGD and colonoscopy for the assessment of GERD and colorectal cancer screening      REVIEW OF SYSTEMS: Per the HPI, and otherwise unremarkable. Historical Information   Past Medical History:   Diagnosis Date   • Bronchitis    • COVID    • GERD (gastroesophageal reflux disease)    • Hypertension      Past Surgical History:   Procedure Laterality Date   • HERNIA REPAIR     • WISDOM TOOTH EXTRACTION Bilateral 2002     Social History   Social History     Substance and Sexual Activity   Alcohol Use Not Currently    Comment: socially     Social History     Substance and Sexual Activity   Drug Use Never     Social History     Tobacco Use   Smoking Status Never   • Passive exposure: Past   Smokeless Tobacco Never     Family History   Problem Relation Age of Onset   • Diabetes Mother    • No Known Problems Father    • No Known Problems Brother    • Colon cancer Paternal Grandfather    • Diabetes Maternal Aunt    • Diabetes Maternal Uncle    • Diabetes Paternal Aunt    • Diabetes Paternal Uncle        Meds/Allergies     (Not in a hospital admission)      Allergies   Allergen Reactions   • Penicillins Rash       Objective     Blood pressure 135/81, pulse 69, temperature (!) 96.7 °F (35.9 °C), temperature source Tympanic, resp. rate 18, height 5' 7" (1.702 m), weight 98.9 kg (218 lb), SpO2 95 %.       PHYSICAL EXAM    Gen: NAD  CV: RRR  CHEST: Clear  ABD: soft, NT/ND  EXT: no edema      ASSESSMENT/PLAN:  This is a 57y.o. year old male here for EGD and colonoscopy

## 2023-07-06 NOTE — DISCHARGE INSTRUCTIONS
Upper Endoscopy and Colonoscopy   WHAT YOU NEED TO KNOW:   An upper endoscopy is also called an upper gastrointestinal (GI) endoscopy, or an esophagogastroduodenoscopy (EGD). It is a procedure to examine the inside of your esophagus, stomach, and duodenum (first part of the small intestine) with a scope. You may feel bloated, gassy, or have some abdominal discomfort after your procedure. Your throat may be sore for 24 to 36 hours. You may burp or pass gas from air that is still inside your body. A colonoscopy is a procedure to examine the inside of your colon (intestine) with a scope. Polyps or tissue growths may have been removed during your colonoscopy. It is normal to feel bloated and to have some abdominal discomfort. You should be passing gas. If you have hemorrhoids or you had polyps removed, you may have a small amount of bleeding. DISCHARGE INSTRUCTIONS:   Seek care immediately if:   You have sudden, severe abdominal pain. You have problems swallowing. You have a large amount of black, sticky bowel movements or blood in your bowel movements. You have sudden trouble breathing. You feel weak, lightheaded, or faint or your heart beats faster than normal for you. Contact your healthcare provider if:   You have a fever and chills. You have nausea or are vomiting. Your abdomen is bloated or feels full and hard. You have abdominal pain. You have a large amount of black, sticky bowel movements or blood in your bowel movements. You have not had a bowel movement for 3 days after your procedure. You have rash or hives. You have questions or concerns about your procedure. Activity:   ·       Do not lift, strain, or run for 24 hours after your procedure. ·       Rest after your procedure. You have been given medicine to relax you. Do not drive or make important decisions until the day after your procedure.  Return to your normal activity as directed. ·       Relieve gas and discomfort from bloating by lying on your right side with a heating pad on your abdomen. You may need to take short walks to help the gas move out. Eat small meals until bloating is relieved. Follow up with your healthcare provider as directed: Write down your questions so you remember to ask them during your visits. If you take a “blood thinner”, please review the specific instructions from your endoscopist about when you should resume it. These can be found in the “Recommendation” and “Your Medication list” sections of this After Visit Summary.

## 2023-07-10 PROCEDURE — 88305 TISSUE EXAM BY PATHOLOGIST: CPT | Performed by: PATHOLOGY

## 2023-08-14 ENCOUNTER — OFFICE VISIT (OUTPATIENT)
Dept: FAMILY MEDICINE CLINIC | Facility: CLINIC | Age: 48
End: 2023-08-14
Payer: COMMERCIAL

## 2023-08-14 VITALS
TEMPERATURE: 97.4 F | DIASTOLIC BLOOD PRESSURE: 86 MMHG | OXYGEN SATURATION: 96 % | HEART RATE: 80 BPM | HEIGHT: 67 IN | RESPIRATION RATE: 18 BRPM | WEIGHT: 227.4 LBS | BODY MASS INDEX: 35.69 KG/M2 | SYSTOLIC BLOOD PRESSURE: 124 MMHG

## 2023-08-14 DIAGNOSIS — I10 PRIMARY HYPERTENSION: ICD-10-CM

## 2023-08-14 DIAGNOSIS — Z00.00 ANNUAL PHYSICAL EXAM: Primary | ICD-10-CM

## 2023-08-14 DIAGNOSIS — R29.818 SUSPECTED SLEEP APNEA: ICD-10-CM

## 2023-08-14 PROCEDURE — 99396 PREV VISIT EST AGE 40-64: CPT | Performed by: NURSE PRACTITIONER

## 2023-08-14 RX ORDER — AMLODIPINE BESYLATE 2.5 MG/1
2.5 TABLET ORAL DAILY
Qty: 90 TABLET | Refills: 1 | Status: SHIPPED | OUTPATIENT
Start: 2023-08-14

## 2023-08-14 NOTE — PROGRESS NOTES
Name: Cayla Ramos      : 1975      MRN: [id number]  Encounter Provider: ROSE Duvall  Encounter Date: 2023   Encounter department: [facility]     {There are no diagnoses linked to this encounter. (Refresh or delete this SmartLink)}       Subjective      Pt is a 56 yo male here for routine follow up. PMH includes HTN, hyperlipidemia, GERD. At last visit, started on amlodipine 2.5 mg and advised to continue home bp monitoring periodically  Taking gemfibrozil for hyperlipidemia, most marked elevation of triglycerides. Review of Systems    Current Outpatient Medications on File Prior to Visit   Medication Sig   • amLODIPine (NORVASC) 2.5 mg tablet Take 1 tablet (2.5 mg total) by mouth daily   • bisacodyl (DULCOLAX) 5 mg EC tablet Take 1 tablet (5 mg total) by mouth once for 1 dose   • Cholecalciferol (Vitamin D) 50 MCG (2000 UT) tablet Take 2 tablets by mouth daily   • famotidine (PEPCID) 20 mg tablet Take 1 tablet (20 mg total) by mouth 2 (two) times a day   • gemfibrozil (LOPID) 600 mg tablet Take by mouth 2 (two) times a day   • polyethylene glycol (GOLYTELY) 4000 mL solution Take 4,000 mL by mouth once for 1 dose       Objective     There were no vitals taken for this visit.     Physical Exam  [address]

## 2023-08-14 NOTE — ASSESSMENT & PLAN NOTE
Unremarkable exam of adult male, labs reviewed. Colon ca screening up to date. Vaccines up to date. Encourage healthy diet, regular exercise. Encourage routine dental and eye care.

## 2023-08-14 NOTE — PROGRESS NOTES
[address] [facility]    NAME: Anni Whittington  AGE: 57 y.o. SEX: male  : 1975     DATE: 2023     Assessment and Plan:     Problem List Items Addressed This Visit        Cardiovascular and Mediastinum    Primary hypertension     bp acceptable, continue amlodipine 2.5 mg daily, follow up 4 months. Relevant Medications    amLODIPine (NORVASC) 2.5 mg tablet       Other    Annual physical exam - Primary     Unremarkable exam of adult male, labs reviewed. Colon ca screening up to date. Vaccines up to date. Encourage healthy diet, regular exercise. Encourage routine dental and eye care. Suspected sleep apnea     History of snoring, un-refreshing sleep, obesity, HTN. Home study ordered. Relevant Orders    Home Study       Immunizations and preventive care screenings were discussed with patient today. Appropriate education was printed on patient's after visit summary. Discussed risks and benefits of prostate cancer screening. We discussed the controversial history of PSA screening for prostate cancer in the [facility] as well as the risk of over detection and over treatment of prostate cancer by way of PSA screening. The patient understands that PSA blood testing is an imperfect way to screen for prostate cancer and that elevated PSA levels in the blood may also be caused by infection, inflammation, prostatic trauma or manipulation, urological procedures, or by benign prostatic enlargement. The role of the digital rectal examination in prostate cancer screening was also discussed and I discussed with him that there is large interobserver variability in the findings of digital rectal examination. Counseling:  Dental Health: discussed importance of regular tooth brushing, flossing, and dental visits.   Injury prevention: discussed safety/seat belts, safety helmets, smoke detectors, carbon dioxide detectors, and smoking near bedding or upholstery. · Exercise: the importance of regular exercise/physical activity was discussed. Recommend exercise 3-5 times per week for at least 30 minutes. No follow-ups on file. Chief Complaint:     Chief Complaint   Patient presents with   • Follow-up     Patient being seen for 3 month follow up      History of Present Illness:     Adult Annual Physical   Patient here for a comprehensive physical exam. The patient reports no problems. Diet and Physical Activity  · Diet/Nutrition: poor diet, frequent junk food, limited fruits/vegetables and cut out seeds. · Exercise: walking, 5-7 times a week on average and 30-60 minutes on average. Depression Screening  PHQ-2/9 Depression Screening         General Health  · Sleep: gets 4-6 hours of sleep on average, gets 7-8 hours of sleep on average and snores loudly. · Hearing: normal - bilateral.  · Vision: most recent eye exam >1 year ago. · Dental: regular dental visits, brushes teeth twice daily and flosses teeth occasionally.  Health  · Symptoms include: none     Review of Systems:     Review of Systems   Constitutional: Negative for activity change, appetite change, chills, fatigue, fever and unexpected weight change. HENT: Negative for hearing loss. Eyes: Negative for visual disturbance. Respiratory: Negative for cough and shortness of breath. Cardiovascular: Negative for chest pain, palpitations and leg swelling. Gastrointestinal: Negative for abdominal pain, constipation, diarrhea, nausea and vomiting. Genitourinary: Negative for dysuria and frequency. Musculoskeletal: Negative for arthralgias and myalgias. Allergic/Immunologic: Negative for environmental allergies. Neurological: Negative for dizziness, weakness, numbness and headaches. Psychiatric/Behavioral: Negative for dysphoric mood and sleep disturbance. The patient is not nervous/anxious.        Past Medical History: Past Medical History:   Diagnosis Date   • Bronchitis    • COVID    • GERD (gastroesophageal reflux disease)    • Hypertension       Past Surgical History:     Past Surgical History:   Procedure Laterality Date   • HERNIA REPAIR     • WISDOM TOOTH EXTRACTION Bilateral 2002      Family History:     Family History   Problem Relation Age of Onset   • Diabetes Mother    • No Known Problems Father    • No Known Problems Brother    • Colon cancer Paternal Grandfather    • Diabetes Maternal Aunt    • Diabetes Maternal Uncle    • Diabetes Paternal Aunt    • Diabetes Paternal Uncle       Social History:     Social History     Socioeconomic History   • Marital status: Single     Spouse name: None   • Number of children: None   • Years of education: None   • Highest education level: None   Occupational History   • None   Tobacco Use   • Smoking status: Never     Passive exposure: Past   • Smokeless tobacco: Never   Vaping Use   • Vaping Use: Never used   Substance and Sexual Activity   • Alcohol use: Not Currently   • Drug use: Never   • Sexual activity: Yes     Birth control/protection: None   Other Topics Concern   • None   Social History Narrative    Lives with his girlfriend    Works full time    Exercise: 3 days per week, walking    Diet: poor, eats out too much    Caffeine: soda - 1 can per day     Social Determinants of Health     Financial Resource Strain: Not on file   Food Insecurity: Not on file   Transportation Needs: Not on file   Physical Activity: Not on file   Stress: Not on file   Social Connections: Not on file   Intimate Partner Violence: Not on file   Housing Stability: Not on file      Current Medications:     Current Outpatient Medications   Medication Sig Dispense Refill   • amLODIPine (NORVASC) 2.5 mg tablet Take 1 tablet (2.5 mg total) by mouth daily 90 tablet 1   • Cholecalciferol (Vitamin D) 50 MCG (2000 UT) tablet Take 2 tablets by mouth daily     • famotidine (PEPCID) 20 mg tablet Take 1 tablet (20 mg total) by mouth 2 (two) times a day 60 tablet 0   • gemfibrozil (LOPID) 600 mg tablet Take by mouth 2 (two) times a day     • bisacodyl (DULCOLAX) 5 mg EC tablet Take 1 tablet (5 mg total) by mouth once for 1 dose 2 tablet 0   • polyethylene glycol (GOLYTELY) 4000 mL solution Take 4,000 mL by mouth once for 1 dose 4000 mL 0     No current facility-administered medications for this visit. Allergies: Allergies   Allergen Reactions   • Penicillins Rash      Physical Exam:     /86 (BP Location: Left arm, Patient Position: Sitting, Cuff Size: Large)   Pulse 80   Temp (!) 97.4 °F (36.3 °C) (Tympanic)   Resp 18   Ht 5' 7" (1.702 m)   Wt 103 kg (227 lb 6.4 oz)   SpO2 96%   BMI 35.62 kg/m²     Physical Exam  Vitals reviewed. Constitutional:       General: He is awake. He is not in acute distress. Appearance: Normal appearance. He is well-developed and well-groomed. He is not ill-appearing. HENT:      Head: Normocephalic and atraumatic. Right Ear: Hearing, ear canal and external ear normal. There is impacted cerumen. Left Ear: Hearing, ear canal and external ear normal. There is impacted cerumen. Nose: Nose normal.      Mouth/Throat:      Lips: Pink. Pharynx: Oropharynx is clear. Eyes:      General: Lids are normal.      Conjunctiva/sclera: Conjunctivae normal.      Pupils: Pupils are equal, round, and reactive to light. Neck:      Thyroid: No thyromegaly. Vascular: Normal carotid pulses. No carotid bruit or JVD. Cardiovascular:      Rate and Rhythm: Normal rate and regular rhythm. Pulses: Normal pulses. Heart sounds: Normal heart sounds. No murmur heard. Pulmonary:      Effort: Pulmonary effort is normal.      Breath sounds: Normal breath sounds. Abdominal:      General: Abdomen is flat. Bowel sounds are normal.      Palpations: Abdomen is soft. Tenderness: There is no abdominal tenderness.    Musculoskeletal:         General: Normal range of motion. Cervical back: Normal range of motion. Right lower leg: No edema. Left lower leg: No edema. Lymphadenopathy:      Cervical: No cervical adenopathy. Skin:     General: Skin is warm and dry. Capillary Refill: Capillary refill takes less than 2 seconds. Neurological:      Mental Status: He is alert and oriented to person, place, and time. Motor: Motor function is intact. Psychiatric:         Attention and Perception: Attention normal.         Mood and Affect: Mood normal.         Speech: Speech normal.         Behavior: Behavior normal. Behavior is cooperative. Thought Content:  Thought content normal.         Cognition and Memory: Cognition normal.         Judgment: Judgment normal.          Tressa Mooney, [address] [facility]

## 2023-08-18 ENCOUNTER — TELEPHONE (OUTPATIENT)
Dept: SLEEP CENTER | Facility: CLINIC | Age: 48
End: 2023-08-18

## 2023-08-18 NOTE — TELEPHONE ENCOUNTER
----- Message from Kenna Frost MD sent at 8/17/2023  7:29 PM EDT -----  approved  ----- Message -----  From: Rosa Lopez  Sent: 8/15/2023   9:25 AM EDT  To: Sleep Medicine [facility] Provider    This Home sleep study needs approval.     If approved please sign and return to clerical pool. If denied please include reasons why. Also provide alternative testing if warranted. Please sign and return to clerical pool.

## 2023-09-04 DIAGNOSIS — I10 PRIMARY HYPERTENSION: ICD-10-CM

## 2023-09-05 RX ORDER — AMLODIPINE BESYLATE 2.5 MG/1
2.5 TABLET ORAL DAILY
Qty: 90 TABLET | Refills: 0 | Status: SHIPPED | OUTPATIENT
Start: 2023-09-05

## 2023-09-07 ENCOUNTER — APPOINTMENT (OUTPATIENT)
Dept: LAB | Facility: CLINIC | Age: 48
End: 2023-09-07
Payer: COMMERCIAL

## 2023-09-07 DIAGNOSIS — L20.89 FLEXURAL ATOPIC DERMATITIS: ICD-10-CM

## 2023-09-07 LAB
ALBUMIN SERPL BCP-MCNC: 4.4 G/DL (ref 3.5–5)
ALP SERPL-CCNC: 71 U/L (ref 34–104)
ALT SERPL W P-5'-P-CCNC: 47 U/L (ref 7–52)
ANION GAP SERPL CALCULATED.3IONS-SCNC: 6 MMOL/L
AST SERPL W P-5'-P-CCNC: 21 U/L (ref 13–39)
BASOPHILS # BLD AUTO: 0.06 THOUSANDS/ÂΜL (ref 0–0.1)
BASOPHILS NFR BLD AUTO: 1 % (ref 0–1)
BILIRUB DIRECT SERPL-MCNC: 0.05 MG/DL (ref 0–0.2)
BILIRUB SERPL-MCNC: 0.41 MG/DL (ref 0.2–1)
BUN SERPL-MCNC: 13 MG/DL (ref 5–25)
CALCIUM SERPL-MCNC: 9.3 MG/DL (ref 8.4–10.2)
CHLORIDE SERPL-SCNC: 104 MMOL/L (ref 96–108)
CHOLEST SERPL-MCNC: 184 MG/DL
CO2 SERPL-SCNC: 29 MMOL/L (ref 21–32)
CREAT SERPL-MCNC: 0.8 MG/DL (ref 0.6–1.3)
EOSINOPHIL # BLD AUTO: 0.16 THOUSAND/ÂΜL (ref 0–0.61)
EOSINOPHIL NFR BLD AUTO: 2 % (ref 0–6)
ERYTHROCYTE [DISTWIDTH] IN BLOOD BY AUTOMATED COUNT: 12.9 % (ref 11.6–15.1)
GFR SERPL CREATININE-BSD FRML MDRD: 107 ML/MIN/1.73SQ M
GLUCOSE P FAST SERPL-MCNC: 108 MG/DL (ref 65–99)
HBV CORE AB SER QL: NORMAL
HBV SURFACE AB SER-ACNC: 5.23 MIU/ML
HBV SURFACE AG SER QL: NORMAL
HCT VFR BLD AUTO: 44.3 % (ref 36.5–49.3)
HCV AB SER QL: NORMAL
HDLC SERPL-MCNC: 39 MG/DL
HGB BLD-MCNC: 15.2 G/DL (ref 12–17)
IMM GRANULOCYTES # BLD AUTO: 0.1 THOUSAND/UL (ref 0–0.2)
IMM GRANULOCYTES NFR BLD AUTO: 1 % (ref 0–2)
LDLC SERPL CALC-MCNC: 112 MG/DL (ref 0–100)
LYMPHOCYTES # BLD AUTO: 2.75 THOUSANDS/ÂΜL (ref 0.6–4.47)
LYMPHOCYTES NFR BLD AUTO: 39 % (ref 14–44)
MCH RBC QN AUTO: 29.9 PG (ref 26.8–34.3)
MCHC RBC AUTO-ENTMCNC: 34.3 G/DL (ref 31.4–37.4)
MCV RBC AUTO: 87 FL (ref 82–98)
MONOCYTES # BLD AUTO: 0.66 THOUSAND/ÂΜL (ref 0.17–1.22)
MONOCYTES NFR BLD AUTO: 9 % (ref 4–12)
NEUTROPHILS # BLD AUTO: 3.42 THOUSANDS/ÂΜL (ref 1.85–7.62)
NEUTS SEG NFR BLD AUTO: 48 % (ref 43–75)
NONHDLC SERPL-MCNC: 145 MG/DL
NRBC BLD AUTO-RTO: 0 /100 WBCS
PLATELET # BLD AUTO: 275 THOUSANDS/UL (ref 149–390)
PMV BLD AUTO: 9.5 FL (ref 8.9–12.7)
POTASSIUM SERPL-SCNC: 4.4 MMOL/L (ref 3.5–5.3)
PROT SERPL-MCNC: 7.5 G/DL (ref 6.4–8.4)
RBC # BLD AUTO: 5.08 MILLION/UL (ref 3.88–5.62)
SODIUM SERPL-SCNC: 139 MMOL/L (ref 135–147)
TRIGL SERPL-MCNC: 165 MG/DL
WBC # BLD AUTO: 7.15 THOUSAND/UL (ref 4.31–10.16)

## 2023-09-07 PROCEDURE — 86704 HEP B CORE ANTIBODY TOTAL: CPT

## 2023-09-07 PROCEDURE — 80061 LIPID PANEL: CPT

## 2023-09-07 PROCEDURE — 86803 HEPATITIS C AB TEST: CPT

## 2023-09-07 PROCEDURE — 86706 HEP B SURFACE ANTIBODY: CPT

## 2023-09-07 PROCEDURE — 86480 TB TEST CELL IMMUN MEASURE: CPT

## 2023-09-07 PROCEDURE — 36415 COLL VENOUS BLD VENIPUNCTURE: CPT

## 2023-09-07 PROCEDURE — 80048 BASIC METABOLIC PNL TOTAL CA: CPT

## 2023-09-07 PROCEDURE — 80076 HEPATIC FUNCTION PANEL: CPT

## 2023-09-07 PROCEDURE — 85025 COMPLETE CBC W/AUTO DIFF WBC: CPT

## 2023-09-07 PROCEDURE — 87340 HEPATITIS B SURFACE AG IA: CPT

## 2023-09-09 LAB
GAMMA INTERFERON BACKGROUND BLD IA-ACNC: 0.02 IU/ML
M TB IFN-G BLD-IMP: NEGATIVE
M TB IFN-G CD4+ BCKGRND COR BLD-ACNC: 0.01 IU/ML
M TB IFN-G CD4+ BCKGRND COR BLD-ACNC: 0.01 IU/ML
MITOGEN IGNF BCKGRD COR BLD-ACNC: >10 IU/ML

## 2023-10-04 ENCOUNTER — HOSPITAL ENCOUNTER (OUTPATIENT)
Dept: SLEEP CENTER | Facility: CLINIC | Age: 48
Discharge: HOME | End: 2023-10-04
Payer: COMMERCIAL

## 2023-10-04 DIAGNOSIS — R29.818 SUSPECTED SLEEP APNEA: ICD-10-CM

## 2023-10-04 PROCEDURE — G0399 HOME SLEEP TEST/TYPE 3 PORTA: HCPCS

## 2023-10-19 ENCOUNTER — OFFICE VISIT (OUTPATIENT)
Dept: SURGERY | Facility: CLINIC | Age: 48
End: 2023-10-19
Payer: COMMERCIAL

## 2023-10-19 ENCOUNTER — OFFICE VISIT (OUTPATIENT)
Dept: FAMILY MEDICINE CLINIC | Facility: CLINIC | Age: 48
End: 2023-10-19
Payer: COMMERCIAL

## 2023-10-19 VITALS
HEIGHT: 67 IN | SYSTOLIC BLOOD PRESSURE: 130 MMHG | OXYGEN SATURATION: 96 % | DIASTOLIC BLOOD PRESSURE: 80 MMHG | HEART RATE: 91 BPM | RESPIRATION RATE: 16 BRPM | BODY MASS INDEX: 36.73 KG/M2 | WEIGHT: 234 LBS | TEMPERATURE: 97.6 F

## 2023-10-19 VITALS — BODY MASS INDEX: 34.86 KG/M2 | WEIGHT: 230 LBS | HEIGHT: 68 IN

## 2023-10-19 DIAGNOSIS — L02.91 ABSCESS: ICD-10-CM

## 2023-10-19 DIAGNOSIS — L02.11 ABSCESS, NECK: Primary | ICD-10-CM

## 2023-10-19 PROCEDURE — 10060 I&D ABSCESS SIMPLE/SINGLE: CPT | Performed by: SURGERY

## 2023-10-19 PROCEDURE — 99203 OFFICE O/P NEW LOW 30 MIN: CPT | Performed by: SURGERY

## 2023-10-19 PROCEDURE — 99213 OFFICE O/P EST LOW 20 MIN: CPT | Performed by: NURSE PRACTITIONER

## 2023-10-19 RX ORDER — UPADACITINIB 15 MG/1
15 TABLET, EXTENDED RELEASE ORAL DAILY
COMMUNITY
Start: 2023-10-18

## 2023-10-19 NOTE — PROGRESS NOTES
Assessment/Plan:    Diagnoses and all orders for this visit:    Abscess    Status post incision and drainage of right neck abscess here in the office. Small amount of packing placed. Wound care instructions given. We will see back in 2 to 3 weeks to reassess    Subjective:      Patient ID: Felton Cook is a 57 y.o. male. Patient presents for evaluation of an abscess on the right side of his neck. States he has had the abscess for 5 to 6 days. He has discomfort. Abscess has increased in size. Denies ever having a cyst in this area before. This is located right at the edge of his hairline. Abscess  The current episode started in the past 7 days. The problem has been gradually worsening. The following portions of the patient's history were reviewed and updated as appropriate:     He  has a past medical history of Bronchitis, COVID, GERD (gastroesophageal reflux disease), and Hypertension. He  has a past surgical history that includes Hernia repair and [place] tooth extraction (Bilateral, 2002). His family history includes Colon cancer in his paternal grandfather; Diabetes in his maternal aunt, maternal uncle, mother, paternal aunt, and paternal uncle; No Known Problems in his brother and father. He  reports that he has never smoked. He has been exposed to tobacco smoke. He has never used smokeless tobacco. He reports that he does not currently use alcohol. He reports that he does not use drugs.   Current Outpatient Medications   Medication Sig Dispense Refill    amLODIPine (NORVASC) 2.5 mg tablet Take 1 tablet (2.5 mg total) by mouth daily 90 tablet 0    bisacodyl (DULCOLAX) 5 mg EC tablet Take 1 tablet (5 mg total) by mouth once for 1 dose (Patient not taking: Reported on 10/19/2023) 2 tablet 0    Cholecalciferol (Vitamin D) 50 MCG (2000 UT) tablet Take 2 tablets by mouth daily      famotidine (PEPCID) 20 mg tablet Take 1 tablet (20 mg total) by mouth 2 (two) times a day 60 tablet 0 gemfibrozil (LOPID) 600 mg tablet Take by mouth 2 (two) times a day      polyethylene glycol (GOLYTELY) 4000 mL solution Take 4,000 mL by mouth once for 1 dose (Patient not taking: Reported on 10/19/2023) 4000 mL 0    Rinvoq 15 MG TB24 Take 15 mg by mouth in the morning       No current facility-administered medications for this visit. He is allergic to penicillins. .    Review of Systems   All other systems reviewed and are negative. Objective:      Ht 5' 8" (1.727 m)   Wt 104 kg (230 lb)   BMI 34.97 kg/m²          Physical Exam  Skin:     General: Skin is warm and dry. Comments: 2 cm abscess along the hairline behind his right ear. Risks and benefits for an incision and drainage procedure under local here in the office were discussed with him and he agreed to proceed       Incision and Drainage    Date/Time: 10/19/2023 2:45 PM    Performed by: Meche Torres DO  Authorized by: Meche Torres DO  Universal Protocol:  Consent: Verbal consent obtained. Risks and benefits: risks, benefits and alternatives were discussed  Consent given by: patient  Time out: Immediately prior to procedure a "time out" was called to verify the correct patient, procedure, equipment, support staff and site/side marked as required. Patient understanding: patient states understanding of the procedure being performed  Patient identity confirmed: verbally with patient    Patient location:  Clinic  Location:     Type:  Abscess    Location:  Head/neck    Head/neck location:  Neck  Pre-procedure details:     Procedure prep: Alcohol. Anesthesia (see MAR for exact dosages):      Anesthesia method:  Local infiltration    Local anesthetic:  Lidocaine 1% WITH epi  Procedure details:     Complexity:  Intermediate    Incision types:  Cruciate    Scalpel blade:  11    Approach:  Open    Incision depth:  Subcutaneous    Wound management:  Probed and deloculated    Drainage:  Bloody and purulent    Wound treatment:  Wound left open    Packing materials:  1/4 in gauze  Post-procedure details:     Patient tolerance of procedure:   Tolerated well, no immediate complications

## 2023-10-19 NOTE — PROGRESS NOTES
Name: Yordan Sanford      : 1975      MRN: [id number]  Encounter Provider: ROSE Hargrove  Encounter Date: 10/19/2023   Encounter department: [facility]     {There are no diagnoses linked to this encounter. (Refresh or delete this SmartLink)}       Subjective      Pt is a 57 y.o. y/o male who is seen today for evaluation of URI symptoms and an ingrown hair. Past medical history of GERD, LAUREL, HTN, hyperlipidemia. Rash  This is a new problem. The current episode started in the past 7 days. The problem has been rapidly worsening since onset. The affected locations include the neck. The rash is characterized by swelling. It is unknown if there was an exposure to a precipitant. Pertinent negatives include no anorexia, congestion, cough, diarrhea, facial edema, fatigue, fever, joint pain, rhinorrhea, shortness of breath, sore throat or vomiting. Review of Systems   Constitutional:  Negative for fatigue and fever. HENT:  Negative for congestion, rhinorrhea and sore throat. Eyes:  Negative for pain. Respiratory:  Negative for cough and shortness of breath. Gastrointestinal:  Negative for anorexia, diarrhea and vomiting. Musculoskeletal:  Negative for joint pain. Current Outpatient Medications on File Prior to Visit   Medication Sig    amLODIPine (NORVASC) 2.5 mg tablet Take 1 tablet (2.5 mg total) by mouth daily    bisacodyl (DULCOLAX) 5 mg EC tablet Take 1 tablet (5 mg total) by mouth once for 1 dose    Cholecalciferol (Vitamin D) 50 MCG (2000 UT) tablet Take 2 tablets by mouth daily    famotidine (PEPCID) 20 mg tablet Take 1 tablet (20 mg total) by mouth 2 (two) times a day    gemfibrozil (LOPID) 600 mg tablet Take by mouth 2 (two) times a day    polyethylene glycol (GOLYTELY) 4000 mL solution Take 4,000 mL by mouth once for 1 dose       Objective     There were no vitals taken for this visit.     Physical Exam  Sarah Jeong CRNP

## 2023-10-19 NOTE — PROGRESS NOTES
Name: Leon Bah      : 1975      MRN: [id number]  Encounter Provider: ROSE Mcadams  Encounter Date: 10/19/2023   Encounter department: [place] [address]. Abscess, neck  Assessment & Plan:  No systemic complaint of illness, referral to gen surgery for I/d. Orders:  -     Ambulatory Referral to General Surgery; Future           Subjective         Pt is a 57 y.o. y/o male who is seen today for evaluation of an ingrown hair. Past medical history of GERD, LAUREL, HTN, hyperlipidemia. Over the last week he has noticed a spot on his neck he describes as an ingrown hair on his posterior neck on the right side. He applied clindamycin he had at home already to the site, he states it did not help. It did cause some pain with movement initially, but the pain has resolved now. It has scabbed over the last two days after having tried expressing anything out of the site. There has been no drainage. He had not tried warm compresses. He denies fever, chills, body aches. Rash  This is a new problem. The current episode started in the past 7 days. The problem has been rapidly worsening since onset. The affected locations include the neck. The rash is characterized by swelling. It is unknown if there was an exposure to a precipitant. Pertinent negatives include no anorexia, congestion, cough, diarrhea, facial edema, fatigue, fever, joint pain, rhinorrhea, shortness of breath, sore throat or vomiting. Review of Systems   Constitutional:  Negative for chills, fatigue and fever. HENT:  Negative for congestion, ear pain, hearing loss, rhinorrhea and sore throat. Eyes:  Negative for pain. Respiratory:  Negative for cough, chest tightness and shortness of breath. Gastrointestinal:  Negative for anorexia, diarrhea and vomiting. Musculoskeletal:  Negative for joint pain, neck pain and neck stiffness.    Skin:  Positive for wound (right upper posterior neck). Negative for rash. Current Outpatient Medications on File Prior to Visit   Medication Sig    amLODIPine (NORVASC) 2.5 mg tablet Take 1 tablet (2.5 mg total) by mouth daily    Cholecalciferol (Vitamin D) 50 MCG (2000 UT) tablet Take 2 tablets by mouth daily    famotidine (PEPCID) 20 mg tablet Take 1 tablet (20 mg total) by mouth 2 (two) times a day    gemfibrozil (LOPID) 600 mg tablet Take by mouth 2 (two) times a day    Rinvoq 15 MG TB24 Take 15 mg by mouth in the morning    bisacodyl (DULCOLAX) 5 mg EC tablet Take 1 tablet (5 mg total) by mouth once for 1 dose (Patient not taking: Reported on 10/19/2023)    polyethylene glycol (GOLYTELY) 4000 mL solution Take 4,000 mL by mouth once for 1 dose (Patient not taking: Reported on 10/19/2023)       Objective     /80 (BP Location: Left arm, Patient Position: Sitting, Cuff Size: Large)   Pulse 91   Temp 97.6 °F (36.4 °C) (Tympanic)   Resp 16   Ht 5' 7" (1.702 m)   Wt 106 kg (234 lb)   SpO2 96%   BMI 36.65 kg/m²     Physical Exam  Vitals reviewed. Constitutional:       General: He is awake. Appearance: Normal appearance. HENT:      Head: Normocephalic and atraumatic. Neck:      Comments: Erythematous, subcutaneous mass of the right posterior neck measuring 1.5cm, central surface is scabbed in appearance   Cardiovascular:      Rate and Rhythm: Normal rate and regular rhythm. Pulses: Normal pulses. Heart sounds: Normal heart sounds. No murmur heard. Pulmonary:      Effort: Pulmonary effort is normal.      Breath sounds: Normal breath sounds. Musculoskeletal:      Cervical back: Full passive range of motion without pain, normal range of motion and neck supple. Erythema present. No edema, rigidity or tenderness. No pain with movement or muscular tenderness. Normal range of motion. Lymphadenopathy:      Cervical: No cervical adenopathy. Neurological:      Mental Status: He is alert and oriented to person, place, and time. Psychiatric:         Attention and Perception: Attention normal.         Mood and Affect: Mood normal.         Speech: Speech normal.         Behavior: Behavior is cooperative. Thought Content:  Thought content normal.         Cognition and Memory: Cognition normal.         Judgment: Judgment normal.       ROSE Forde

## 2023-10-19 NOTE — LETTER
October 19, 2023     Jakedylan Purvis, [address] [facility] [address] [facility],[address]    Patient: Radha Maldonado   YOB: 1975   Date of Visit: 10/19/2023       Dear Dr. Hector Villar: Thank you for referring Alonso Coffman to me for evaluation. Below are my notes for this consultation. If you have questions, please do not hesitate to call me. I look forward to following your patient along with you. Sincerely,        Brenden Erazo DO        CC: No Recipients    Brenden vora DO  10/19/2023  2:18 PM  Sign when Signing Visit  Assessment/Plan:    Diagnoses and all orders for this visit:    Abscess    Status post incision and drainage of right neck abscess here in the office. Small amount of packing placed. Wound care instructions given. We will see back in 2 to 3 weeks to reassess    Subjective:      Patient ID: Radha Maldonado is a 57 y.o. male. Patient presents for evaluation of an abscess on the right side of his neck. States he has had the abscess for 5 to 6 days. He has discomfort. Abscess has increased in size. Denies ever having a cyst in this area before. This is located right at the edge of his hairline. Abscess  The current episode started in the past 7 days. The problem has been gradually worsening. The following portions of the patient's history were reviewed and updated as appropriate:     He  has a past medical history of Bronchitis, COVID, GERD (gastroesophageal reflux disease), and Hypertension. He  has a past surgical history that includes Hernia repair and [place] tooth extraction (Bilateral, 2002). His family history includes Colon cancer in his paternal grandfather; Diabetes in his maternal aunt, maternal uncle, mother, paternal aunt, and paternal uncle; No Known Problems in his brother and father. He  reports that he has never smoked. He has been exposed to tobacco smoke.  He has never used smokeless tobacco. He reports that he does not currently use alcohol. He reports that he does not use drugs. Current Outpatient Medications   Medication Sig Dispense Refill   • amLODIPine (NORVASC) 2.5 mg tablet Take 1 tablet (2.5 mg total) by mouth daily 90 tablet 0   • bisacodyl (DULCOLAX) 5 mg EC tablet Take 1 tablet (5 mg total) by mouth once for 1 dose (Patient not taking: Reported on 10/19/2023) 2 tablet 0   • Cholecalciferol (Vitamin D) 50 MCG (2000 UT) tablet Take 2 tablets by mouth daily     • famotidine (PEPCID) 20 mg tablet Take 1 tablet (20 mg total) by mouth 2 (two) times a day 60 tablet 0   • gemfibrozil (LOPID) 600 mg tablet Take by mouth 2 (two) times a day     • polyethylene glycol (GOLYTELY) 4000 mL solution Take 4,000 mL by mouth once for 1 dose (Patient not taking: Reported on 10/19/2023) 4000 mL 0   • Rinvoq 15 MG TB24 Take 15 mg by mouth in the morning       No current facility-administered medications for this visit. He is allergic to penicillins. .    Review of Systems   All other systems reviewed and are negative. Objective:      Ht 5' 8" (1.727 m)   Wt 104 kg (230 lb)   BMI 34.97 kg/m²          Physical Exam  Skin:     General: Skin is warm and dry. Comments: 2 cm abscess along the hairline behind his right ear. Risks and benefits for an incision and drainage procedure under local here in the office were discussed with him and he agreed to proceed       Incision and Drainage    Date/Time: 10/19/2023 2:45 PM    Performed by: Thania Anne DO  Authorized by: Thania Anne DO  Universal Protocol:  Consent: Verbal consent obtained. Risks and benefits: risks, benefits and alternatives were discussed  Consent given by: patient  Time out: Immediately prior to procedure a "time out" was called to verify the correct patient, procedure, equipment, support staff and site/side marked as required.   Patient understanding: patient states understanding of the procedure being performed  Patient identity confirmed:

## 2023-10-23 ENCOUNTER — TELEPHONE (OUTPATIENT)
Dept: SLEEP CENTER | Facility: CLINIC | Age: 48
End: 2023-10-23

## 2023-10-23 NOTE — TELEPHONE ENCOUNTER
Called patient and left message providing sleep center number to call to schedule sleep consultation.

## 2023-11-15 ENCOUNTER — OFFICE VISIT (OUTPATIENT)
Dept: SURGERY | Facility: CLINIC | Age: 48
End: 2023-11-15
Payer: COMMERCIAL

## 2023-11-15 VITALS — BODY MASS INDEX: 34.86 KG/M2 | HEIGHT: 68 IN | WEIGHT: 230 LBS

## 2023-11-15 DIAGNOSIS — L02.11 ABSCESS, NECK: Primary | ICD-10-CM

## 2023-11-15 PROCEDURE — 99212 OFFICE O/P EST SF 10 MIN: CPT | Performed by: SURGERY

## 2023-11-15 NOTE — PROGRESS NOTES
Assessment/Plan:    Diagnoses and all orders for this visit:    Abscess, neck    Abscess is completely resolved. Only a scant scar is noted. We will observe    Subjective:      Patient ID: Marco Monroy is a 57 y.o. male. Patient presents for follow up. S/P I&D right neck abscess 10/19/2023. Improved. No particular issues. He is on a medication for eczema (Rinvoq)        The following portions of the patient's history were reviewed and updated as appropriate:     He  has a past medical history of Bronchitis, COVID, GERD (gastroesophageal reflux disease), and Hypertension. He  has a past surgical history that includes Hernia repair; [place] tooth extraction (Bilateral, 2002); and Colonoscopy (8/2023). His family history includes Cancer in his paternal grandfather; Colon cancer in his paternal grandfather; Diabetes in his maternal aunt, maternal uncle, mother, paternal aunt, and paternal uncle; No Known Problems in his brother and father. He  reports that he has never smoked. He has been exposed to tobacco smoke. He has never used smokeless tobacco. He reports current alcohol use of about 1.0 standard drink of alcohol per week. He reports that he does not use drugs.   Current Outpatient Medications   Medication Sig Dispense Refill    amLODIPine (NORVASC) 2.5 mg tablet Take 1 tablet (2.5 mg total) by mouth daily 90 tablet 0    bisacodyl (DULCOLAX) 5 mg EC tablet Take 1 tablet (5 mg total) by mouth once for 1 dose (Patient not taking: Reported on 10/19/2023) 2 tablet 0    Cholecalciferol (Vitamin D) 50 MCG (2000 UT) tablet Take 2 tablets by mouth daily      famotidine (PEPCID) 20 mg tablet Take 1 tablet (20 mg total) by mouth 2 (two) times a day 60 tablet 0    gemfibrozil (LOPID) 600 mg tablet Take by mouth 2 (two) times a day      polyethylene glycol (GOLYTELY) 4000 mL solution Take 4,000 mL by mouth once for 1 dose (Patient not taking: Reported on 10/19/2023) 4000 mL 0    Rinvoq 15 MG TB24 Take 15 mg by mouth in the morning       No current facility-administered medications for this visit. He is allergic to penicillins. .    Review of Systems   All other systems reviewed and are negative. Objective:      Ht 5' 8" (1.727 m)   Wt 104 kg (230 lb)   BMI 34.97 kg/m²          Physical Exam  Skin:     General: Skin is warm and dry. Comments: Well-healed I&D site of the mastoid area. No signs of infection.

## 2023-11-15 NOTE — LETTER
November 15, 2023     Kymberly Braswell, [address] [facility] [address] [facility],[address]    Patient: Mirella Mendez   YOB: 1975   Date of Visit: 11/15/2023       Dear Dr. Anurag Leiva: Thank you for referring Maura Ware to me for evaluation. Below are my notes for this consultation. If you have questions, please do not hesitate to call me. I look forward to following your patient along with you. Sincerely,        Lakhwinder Erazo DO        CC: No Recipients    Lakhwinder vora DO  11/15/2023 12:30 PM  Sign when Signing Visit  Assessment/Plan:    Diagnoses and all orders for this visit:    Abscess, neck    Abscess is completely resolved. Only a scant scar is noted. We will observe    Subjective:      Patient ID: Mirella Mendez is a 57 y.o. male. Patient presents for follow up. S/P I&D right neck abscess 10/19/2023. Improved. No particular issues. He is on a medication for eczema (Rinvoq)        The following portions of the patient's history were reviewed and updated as appropriate:     He  has a past medical history of Bronchitis, COVID, GERD (gastroesophageal reflux disease), and Hypertension. He  has a past surgical history that includes Hernia repair; [place] tooth extraction (Bilateral, 2002); and Colonoscopy (8/2023). His family history includes Cancer in his paternal grandfather; Colon cancer in his paternal grandfather; Diabetes in his maternal aunt, maternal uncle, mother, paternal aunt, and paternal uncle; No Known Problems in his brother and father. He  reports that he has never smoked. He has been exposed to tobacco smoke. He has never used smokeless tobacco. He reports current alcohol use of about 1.0 standard drink of alcohol per week. He reports that he does not use drugs.   Current Outpatient Medications   Medication Sig Dispense Refill   • amLODIPine (NORVASC) 2.5 mg tablet Take 1 tablet (2.5 mg total) by mouth daily 90 tablet 0   • bisacodyl (DULCOLAX) 5 mg EC tablet Take 1 tablet (5 mg total) by mouth once for 1 dose (Patient not taking: Reported on 10/19/2023) 2 tablet 0   • Cholecalciferol (Vitamin D) 50 MCG (2000 UT) tablet Take 2 tablets by mouth daily     • famotidine (PEPCID) 20 mg tablet Take 1 tablet (20 mg total) by mouth 2 (two) times a day 60 tablet 0   • gemfibrozil (LOPID) 600 mg tablet Take by mouth 2 (two) times a day     • polyethylene glycol (GOLYTELY) 4000 mL solution Take 4,000 mL by mouth once for 1 dose (Patient not taking: Reported on 10/19/2023) 4000 mL 0   • Rinvoq 15 MG TB24 Take 15 mg by mouth in the morning       No current facility-administered medications for this visit. He is allergic to penicillins. .    Review of Systems   All other systems reviewed and are negative. Objective:      Ht 5' 8" (1.727 m)   Wt 104 kg (230 lb)   BMI 34.97 kg/m²          Physical Exam  Skin:     General: Skin is warm and dry. Comments: Well-healed I&D site of the mastoid area. No signs of infection.

## 2023-11-27 DIAGNOSIS — I10 PRIMARY HYPERTENSION: ICD-10-CM

## 2023-11-27 RX ORDER — AMLODIPINE BESYLATE 2.5 MG/1
2.5 TABLET ORAL DAILY
Qty: 90 TABLET | Refills: 0 | Status: SHIPPED | OUTPATIENT
Start: 2023-11-27

## 2024-01-03 ENCOUNTER — OFFICE VISIT (OUTPATIENT)
Dept: FAMILY MEDICINE CLINIC | Facility: CLINIC | Age: 49
End: 2024-01-03
Payer: COMMERCIAL

## 2024-01-03 ENCOUNTER — TELEPHONE (OUTPATIENT)
Dept: FAMILY MEDICINE CLINIC | Facility: CLINIC | Age: 49
End: 2024-01-03

## 2024-01-03 VITALS
TEMPERATURE: 96.8 F | HEIGHT: 68 IN | RESPIRATION RATE: 16 BRPM | WEIGHT: 237.4 LBS | DIASTOLIC BLOOD PRESSURE: 86 MMHG | SYSTOLIC BLOOD PRESSURE: 134 MMHG | BODY MASS INDEX: 35.98 KG/M2 | HEART RATE: 89 BPM | OXYGEN SATURATION: 96 %

## 2024-01-03 DIAGNOSIS — J30.9 ALLERGIC RHINITIS, UNSPECIFIED SEASONALITY, UNSPECIFIED TRIGGER: Primary | ICD-10-CM

## 2024-01-03 PROCEDURE — 99213 OFFICE O/P EST LOW 20 MIN: CPT | Performed by: NURSE PRACTITIONER

## 2024-01-03 RX ORDER — IPRATROPIUM BROMIDE 21 UG/1
2 SPRAY, METERED NASAL EVERY 12 HOURS
Qty: 30 ML | Refills: 0 | Status: SHIPPED | OUTPATIENT
Start: 2024-01-03

## 2024-01-03 RX ORDER — DOXYCYCLINE HYCLATE 100 MG/1
CAPSULE ORAL
COMMUNITY
Start: 2023-12-15

## 2024-01-03 NOTE — TELEPHONE ENCOUNTER
LM for pt to call and let us know reason for being seen on 1/5/24 and to clarify if pt has calendar year insurance, otherwise too soon for physical end of Cecilio

## 2024-01-03 NOTE — PROGRESS NOTES
Name: Arnaldo Last      : 1975      MRN: [id number]  Encounter Provider: ROSE Duron  Encounter Date: 1/3/2024   Encounter department: [facility]    Assessment & Plan     1. Allergic rhinitis, unspecified seasonality, unspecified trigger  Assessment & Plan:  Symptoms and exam more suggestive of allergic rhinitis than infection.  Will try ipratropium nasal spray, otc oral anti-histamine.  Pt instructed to call for reevaluation if sx worsen or persist.      Orders:  -     ipratropium (ATROVENT) 0.03 % nasal spray; 2 sprays into each nostril every 12 (twelve) hours           Subjective      Pt is a 48 y.o. y/o male who is seen today for evaluation of cold symptoms.  He states he was treated about 2 weeks ago by his previous pcp with doxycycline 100 mg bid for 10 days and it seemed to resolve.  On  he started with sore throat worse in the morning, itchiness, chest heaviness, rhinorrhea.  No fever, chills.  He feels fatigued, he coughs occasionally and is sneezing.  He is blowing is nose a lot.  Appetite is normal, has had some nausea the last few days, no v/d.  He is not taking anything for his symptoms.  He did not take a covid test.    Review of Systems   Constitutional:  Positive for fatigue. Negative for appetite change, chills and fever.   HENT:  Positive for rhinorrhea, sneezing, sore throat and voice change. Negative for congestion, hearing loss and sinus pressure.    Respiratory:  Positive for cough (occasional), chest tightness and shortness of breath (when he gets in bed if he is not laying the right way). Negative for wheezing.    Gastrointestinal:  Negative for diarrhea, nausea and vomiting.   Musculoskeletal:  Negative for myalgias.   Neurological:  Negative for dizziness, light-headedness and headaches.   Hematological:  Negative for adenopathy.       Current Outpatient Medications on File Prior to Visit   Medication Sig    amLODIPine (NORVASC) 2.5 mg tablet  "take 1 tablet by mouth once daily    Cholecalciferol (Vitamin D) 50 MCG (2000 UT) tablet Take 2 tablets by mouth daily    famotidine (PEPCID) 20 mg tablet Take 1 tablet (20 mg total) by mouth 2 (two) times a day    Rinvoq 15 MG TB24 Take 15 mg by mouth in the morning    bisacodyl (DULCOLAX) 5 mg EC tablet Take 1 tablet (5 mg total) by mouth once for 1 dose (Patient not taking: Reported on 10/19/2023)    doxycycline hyclate (VIBRAMYCIN) 100 mg capsule take 1 capsule by mouth twice a day for 10 days (Patient not taking: Reported on 1/3/2024)    gemfibrozil (LOPID) 600 mg tablet Take by mouth 2 (two) times a day    polyethylene glycol (GOLYTELY) 4000 mL solution Take 4,000 mL by mouth once for 1 dose (Patient not taking: Reported on 10/19/2023)       Objective     /86   Pulse 89   Temp (!) 96.8 °F (36 °C)   Resp 16   Ht 5' 8\" (1.727 m)   Wt 108 kg (237 lb 6.4 oz)   SpO2 96%   BMI 36.10 kg/m²     Physical Exam  Vitals reviewed.   Constitutional:       General: He is awake. He is not in acute distress.     Appearance: Normal appearance. He is well-developed and well-groomed. He is not ill-appearing.   HENT:      Head: Normocephalic.      Right Ear: Hearing, tympanic membrane, ear canal and external ear normal. No middle ear effusion.      Left Ear: Hearing, tympanic membrane, ear canal and external ear normal.  No middle ear effusion.      Nose: Congestion present. No mucosal edema.      Right Turbinates: Enlarged.      Left Turbinates: Enlarged.      Mouth/Throat:      Mouth: Mucous membranes are not dry.      Pharynx: No oropharyngeal exudate or posterior oropharyngeal erythema.      Tonsils: No tonsillar abscesses.   Eyes:      Conjunctiva/sclera: Conjunctivae normal.   Cardiovascular:      Rate and Rhythm: Normal rate and regular rhythm.      Heart sounds: Normal heart sounds. No murmur heard.  Pulmonary:      Effort: Pulmonary effort is normal.      Breath sounds: Normal breath sounds. "   Lymphadenopathy:      Head:      Right side of head: No submental, submandibular, tonsillar, preauricular, posterior auricular or occipital adenopathy.      Left side of head: No submental, submandibular, tonsillar, preauricular, posterior auricular or occipital adenopathy.      Cervical: No cervical adenopathy.   Skin:     General: Skin is warm and dry.   Neurological:      Mental Status: He is alert and oriented to person, place, and time.   Psychiatric:         Attention and Perception: Attention normal.         Mood and Affect: Mood normal.         Speech: Speech normal.         Behavior: Behavior normal. Behavior is cooperative.         Thought Content: Thought content normal.         Cognition and Memory: Cognition normal.         Judgment: Judgment normal.       ROSE Duron

## 2024-01-04 NOTE — ASSESSMENT & PLAN NOTE
Symptoms and exam more suggestive of allergic rhinitis than infection.  Will try ipratropium nasal spray, otc oral anti-histamine.  Pt instructed to call for reevaluation if sx worsen or persist.

## 2024-02-06 ENCOUNTER — RA CDI HCC (OUTPATIENT)
Dept: OTHER | Facility: HOSPITAL | Age: 49
End: 2024-02-06

## 2024-02-16 ENCOUNTER — APPOINTMENT (OUTPATIENT)
Dept: LAB | Facility: CLINIC | Age: 49
End: 2024-02-16
Payer: COMMERCIAL

## 2024-02-16 DIAGNOSIS — L02.821: ICD-10-CM

## 2024-02-16 DIAGNOSIS — Z79.899 ENCOUNTER FOR LONG-TERM (CURRENT) USE OF OTHER MEDICATIONS: ICD-10-CM

## 2024-02-16 DIAGNOSIS — L20.9 ATOPIC DERMATITIS, UNSPECIFIED TYPE: ICD-10-CM

## 2024-02-16 DIAGNOSIS — L57.8 NODULAR ELASTOSIS WITH CYSTS AND COMEDONES OF FAVRE AND RACOUCHOT: ICD-10-CM

## 2024-02-16 DIAGNOSIS — L70.0 NODULAR ELASTOSIS WITH CYSTS AND COMEDONES OF FAVRE AND RACOUCHOT: ICD-10-CM

## 2024-02-16 LAB
ANION GAP SERPL CALCULATED.3IONS-SCNC: 9 MMOL/L
BASOPHILS # BLD AUTO: 0.02 THOUSANDS/ÂΜL (ref 0–0.1)
BASOPHILS NFR BLD AUTO: 0 % (ref 0–1)
BUN SERPL-MCNC: 15 MG/DL (ref 5–25)
CALCIUM SERPL-MCNC: 9 MG/DL (ref 8.4–10.2)
CHLORIDE SERPL-SCNC: 103 MMOL/L (ref 96–108)
CHOLEST SERPL-MCNC: 213 MG/DL
CO2 SERPL-SCNC: 25 MMOL/L (ref 21–32)
CREAT SERPL-MCNC: 0.82 MG/DL (ref 0.6–1.3)
EOSINOPHIL # BLD AUTO: 0.07 THOUSAND/ÂΜL (ref 0–0.61)
EOSINOPHIL NFR BLD AUTO: 1 % (ref 0–6)
ERYTHROCYTE [DISTWIDTH] IN BLOOD BY AUTOMATED COUNT: 12 % (ref 11.6–15.1)
GFR SERPL CREATININE-BSD FRML MDRD: 106 ML/MIN/1.73SQ M
GLUCOSE P FAST SERPL-MCNC: 92 MG/DL (ref 65–99)
HCT VFR BLD AUTO: 42.3 % (ref 36.5–49.3)
HDLC SERPL-MCNC: 41 MG/DL
HGB BLD-MCNC: 14.8 G/DL (ref 12–17)
IMM GRANULOCYTES # BLD AUTO: 0.05 THOUSAND/UL (ref 0–0.2)
IMM GRANULOCYTES NFR BLD AUTO: 1 % (ref 0–2)
LDLC SERPL CALC-MCNC: 105 MG/DL (ref 0–100)
LYMPHOCYTES # BLD AUTO: 2.29 THOUSANDS/ÂΜL (ref 0.6–4.47)
LYMPHOCYTES NFR BLD AUTO: 39 % (ref 14–44)
MCH RBC QN AUTO: 31.2 PG (ref 26.8–34.3)
MCHC RBC AUTO-ENTMCNC: 35 G/DL (ref 31.4–37.4)
MCV RBC AUTO: 89 FL (ref 82–98)
MONOCYTES # BLD AUTO: 0.5 THOUSAND/ÂΜL (ref 0.17–1.22)
MONOCYTES NFR BLD AUTO: 9 % (ref 4–12)
NEUTROPHILS # BLD AUTO: 2.9 THOUSANDS/ÂΜL (ref 1.85–7.62)
NEUTS SEG NFR BLD AUTO: 50 % (ref 43–75)
NRBC BLD AUTO-RTO: 0 /100 WBCS
PLATELET # BLD AUTO: 275 THOUSANDS/UL (ref 149–390)
PMV BLD AUTO: 9.6 FL (ref 8.9–12.7)
POTASSIUM SERPL-SCNC: 4 MMOL/L (ref 3.5–5.3)
RBC # BLD AUTO: 4.75 MILLION/UL (ref 3.88–5.62)
SODIUM SERPL-SCNC: 137 MMOL/L (ref 135–147)
TRIGL SERPL-MCNC: 334 MG/DL
WBC # BLD AUTO: 5.83 THOUSAND/UL (ref 4.31–10.16)

## 2024-02-16 PROCEDURE — 85025 COMPLETE CBC W/AUTO DIFF WBC: CPT

## 2024-02-16 PROCEDURE — 80048 BASIC METABOLIC PNL TOTAL CA: CPT

## 2024-02-16 PROCEDURE — 80061 LIPID PANEL: CPT

## 2024-02-16 PROCEDURE — 36415 COLL VENOUS BLD VENIPUNCTURE: CPT

## 2024-02-19 ENCOUNTER — OFFICE VISIT (OUTPATIENT)
Dept: FAMILY MEDICINE CLINIC | Facility: CLINIC | Age: 49
End: 2024-02-19
Payer: COMMERCIAL

## 2024-02-19 VITALS
TEMPERATURE: 96.3 F | HEART RATE: 72 BPM | SYSTOLIC BLOOD PRESSURE: 134 MMHG | WEIGHT: 235.4 LBS | BODY MASS INDEX: 35.68 KG/M2 | RESPIRATION RATE: 16 BRPM | HEIGHT: 68 IN | OXYGEN SATURATION: 96 % | DIASTOLIC BLOOD PRESSURE: 80 MMHG

## 2024-02-19 DIAGNOSIS — I10 PRIMARY HYPERTENSION: ICD-10-CM

## 2024-02-19 DIAGNOSIS — E78.2 MIXED HYPERLIPIDEMIA: Primary | ICD-10-CM

## 2024-02-19 DIAGNOSIS — Z13.1 SCREENING FOR DIABETES MELLITUS (DM): ICD-10-CM

## 2024-02-19 DIAGNOSIS — G47.33 OSA (OBSTRUCTIVE SLEEP APNEA): ICD-10-CM

## 2024-02-19 PROCEDURE — 99214 OFFICE O/P EST MOD 30 MIN: CPT | Performed by: NURSE PRACTITIONER

## 2024-02-19 RX ORDER — CLINDAMYCIN PHOSPHATE 11.9 MG/ML
SOLUTION TOPICAL
COMMUNITY
Start: 2024-02-07

## 2024-02-19 RX ORDER — AMMONIUM LACTATE 12 G/100G
CREAM TOPICAL
COMMUNITY
Start: 2024-02-07

## 2024-02-19 RX ORDER — GEMFIBROZIL 600 MG/1
600 TABLET, FILM COATED ORAL 2 TIMES DAILY
Qty: 180 TABLET | Refills: 1 | Status: SHIPPED | OUTPATIENT
Start: 2024-02-19

## 2024-02-19 NOTE — ASSESSMENT & PLAN NOTE
Triglycerides went up, he notes he had been eating out a lot for a couple of weeks and intends to get back to a healthier diet.   Continue with regular exercise.  Repeat lipid panel in 4-6 months, continue lopid.

## 2024-02-19 NOTE — PROGRESS NOTES
Name: Arnaldo Last      : 1975      MRN: [id number]  Encounter Provider: ROSE Duron  Encounter Date: 2024   [facility]    Assessment & Plan     1. Mixed hyperlipidemia  Assessment & Plan:  Triglycerides went up, he notes he had been eating out a lot for a couple of weeks and intends to get back to a healthier diet.   Continue with regular exercise.  Repeat lipid panel in 4-6 months, continue lopid.    Orders:  -     gemfibrozil (LOPID) 600 mg tablet; Take 1 tablet (600 mg total) by mouth 2 (two) times a day  -     Lipid panel; Future    2. Screening for diabetes mellitus (DM)  -     Hemoglobin A1C; Future    3. Primary hypertension  Assessment & Plan:  Stable with current management., continue amlodipine.      4. LAUREL (obstructive sleep apnea)  Assessment & Plan:  Did not tolerate cpap.  Pt advised to make a follow up to discuss if there are any other treatment options for him.  Reinforced that untreated obstructive sleep apnea puts him at risk for other, more serious conditions.  He verbalizes understanding.             Subjective      Pt is a 48 y.o. y/o male who is seen today for follow up.  Past medical history of GERD, obstructive sleep apnea, HTN, elevated liver enzymes, hyperlipidemia.   He states he had a rough couple of weeks and was not eating well, his dermatologist had him do labs and his triglycerides did increase.  He has been exercising more but his family was on vacation so he was eating out a lot.  He has no acute concerns today.      Review of Systems   Constitutional:  Negative for fatigue.   Respiratory:  Negative for shortness of breath.    Cardiovascular:  Negative for chest pain and palpitations.   Gastrointestinal:  Negative for nausea and vomiting.   Neurological:  Negative for light-headedness and headaches.       Current Outpatient Medications on File Prior to Visit   Medication Sig    amLODIPine (NORVASC) 2.5 mg tablet  "take 1 tablet by mouth once daily    ammonium lactate (LAC-HYDRIN) 12 % cream APPLY TO AFFECTED AREA(S) OF THE FEET TWICE DAILY, ESPECIALLY AFTER SHOWERING    Cholecalciferol (Vitamin D) 50 MCG (2000 UT) tablet Take 2 tablets by mouth daily    clindamycin (CLEOCIN T) 1 % external solution APPLY TO AFFECTED AREAS OF FACE AS NEEDED FOR FLARES    famotidine (PEPCID) 20 mg tablet Take 1 tablet (20 mg total) by mouth 2 (two) times a day    ipratropium (ATROVENT) 0.03 % nasal spray 2 sprays into each nostril every 12 (twelve) hours    Rinvoq 15 MG TB24 Take 15 mg by mouth in the morning    [DISCONTINUED] bisacodyl (DULCOLAX) 5 mg EC tablet Take 1 tablet (5 mg total) by mouth once for 1 dose (Patient not taking: Reported on 10/19/2023)    [DISCONTINUED] doxycycline hyclate (VIBRAMYCIN) 100 mg capsule take 1 capsule by mouth twice a day for 10 days (Patient not taking: Reported on 1/3/2024)    [DISCONTINUED] gemfibrozil (LOPID) 600 mg tablet Take by mouth 2 (two) times a day    [DISCONTINUED] polyethylene glycol (GOLYTELY) 4000 mL solution Take 4,000 mL by mouth once for 1 dose (Patient not taking: Reported on 10/19/2023)       Objective     /80 (BP Location: Left arm)   Pulse 72   Temp (!) 96.3 °F (35.7 °C) (Tympanic)   Resp 16   Ht 5' 8\" (1.727 m)   Wt 107 kg (235 lb 6.4 oz)   SpO2 96%   BMI 35.79 kg/m²     Physical Exam  Vitals reviewed.   Constitutional:       General: He is awake. He is not in acute distress.     Appearance: Normal appearance. He is well-developed and well-groomed. He is not ill-appearing.   HENT:      Head: Normocephalic.   Eyes:      General: Lids are normal.      Conjunctiva/sclera: Conjunctivae normal.   Cardiovascular:      Rate and Rhythm: Normal rate and regular rhythm.      Heart sounds: Normal heart sounds. No murmur heard.  Pulmonary:      Effort: Pulmonary effort is normal. No respiratory distress.   Skin:     General: Skin is dry.   Neurological:      Mental Status: He is alert " and oriented to person, place, and time.   Psychiatric:         Attention and Perception: Attention normal.         Mood and Affect: Mood normal.         Speech: Speech normal.         Behavior: Behavior normal. Behavior is cooperative.         Thought Content: Thought content normal.         Cognition and Memory: Cognition normal.         Judgment: Judgment normal.       ROSE Duron

## 2024-02-19 NOTE — ASSESSMENT & PLAN NOTE
Did not tolerate cpap.  Pt advised to make a follow up to discuss if there are any other treatment options for him.  Reinforced that untreated obstructive sleep apnea puts him at risk for other, more serious conditions.  He verbalizes understanding.

## 2024-02-26 DIAGNOSIS — I10 PRIMARY HYPERTENSION: ICD-10-CM

## 2024-02-26 RX ORDER — AMLODIPINE BESYLATE 2.5 MG/1
2.5 TABLET ORAL DAILY
Qty: 90 TABLET | Refills: 0 | Status: SHIPPED | OUTPATIENT
Start: 2024-02-26

## 2024-02-27 DIAGNOSIS — I10 PRIMARY HYPERTENSION: ICD-10-CM

## 2024-02-27 RX ORDER — AMLODIPINE BESYLATE 2.5 MG/1
2.5 TABLET ORAL DAILY
Qty: 90 TABLET | Refills: 0 | OUTPATIENT
Start: 2024-02-27

## 2024-04-09 NOTE — PROGRESS NOTES
Home Sleep Study Documentation    HOME STUDY DEVICE: Noxturnal no                                           Roseann G3 yes      Pre-Sleep Home Study:    Set-up and instructions performed by: Javier Sterling performed demonstration for Patient: yes    Return demonstration performed by Patient: yes    Written instructions provided to Patient: yes    Patient signed consent form: yes        Post-Sleep Home Study:    Additional comments by Patient: none    Home Sleep Study Failed:no:    Failure reason: N/A    Reported or Detected: N/A    Scored by:  BARRY Burch poor minus

## 2024-06-05 ENCOUNTER — OFFICE VISIT (OUTPATIENT)
Dept: FAMILY MEDICINE CLINIC | Facility: CLINIC | Age: 49
End: 2024-06-05
Payer: COMMERCIAL

## 2024-06-05 VITALS
HEART RATE: 75 BPM | HEIGHT: 68 IN | BODY MASS INDEX: 35.19 KG/M2 | OXYGEN SATURATION: 96 % | WEIGHT: 232.2 LBS | TEMPERATURE: 97 F | RESPIRATION RATE: 16 BRPM | SYSTOLIC BLOOD PRESSURE: 122 MMHG | DIASTOLIC BLOOD PRESSURE: 78 MMHG

## 2024-06-05 DIAGNOSIS — R39.9 LOWER URINARY TRACT SYMPTOMS (LUTS): Primary | ICD-10-CM

## 2024-06-05 LAB
BILIRUB UR QL STRIP: NEGATIVE
CLARITY UR: CLEAR
COLOR UR: NORMAL
GLUCOSE UR STRIP-MCNC: NEGATIVE MG/DL
HGB UR QL STRIP.AUTO: NEGATIVE
KETONES UR STRIP-MCNC: NEGATIVE MG/DL
LEUKOCYTE ESTERASE UR QL STRIP: NEGATIVE
NITRITE UR QL STRIP: NEGATIVE
PH UR STRIP.AUTO: 6 [PH]
PROT UR STRIP-MCNC: NEGATIVE MG/DL
SL AMB  POCT GLUCOSE, UA: NEGATIVE
SL AMB LEUKOCYTE ESTERASE,UA: NEGATIVE
SL AMB POCT BILIRUBIN,UA: NEGATIVE
SL AMB POCT BLOOD,UA: NEGATIVE
SL AMB POCT CLARITY,UA: CLEAR
SL AMB POCT COLOR,UA: YELLOW
SL AMB POCT KETONES,UA: NEGATIVE
SL AMB POCT NITRITE,UA: NEGATIVE
SL AMB POCT PH,UA: 6
SL AMB POCT SPECIFIC GRAVITY,UA: 1.03
SL AMB POCT URINE PROTEIN: NEGATIVE
SL AMB POCT UROBILINOGEN: NEGATIVE
SP GR UR STRIP.AUTO: 1.02 (ref 1–1.03)
UROBILINOGEN UR STRIP-ACNC: <2 MG/DL

## 2024-06-05 PROCEDURE — 99214 OFFICE O/P EST MOD 30 MIN: CPT | Performed by: NURSE PRACTITIONER

## 2024-06-05 PROCEDURE — 81003 URINALYSIS AUTO W/O SCOPE: CPT | Performed by: NURSE PRACTITIONER

## 2024-06-05 PROCEDURE — 81002 URINALYSIS NONAUTO W/O SCOPE: CPT | Performed by: NURSE PRACTITIONER

## 2024-06-05 RX ORDER — MINOCYCLINE HYDROCHLORIDE 100 MG/1
100 CAPSULE ORAL DAILY
COMMUNITY
Start: 2024-04-27

## 2024-06-05 NOTE — ASSESSMENT & PLAN NOTE
Chronic symptoms that seem to be getting worse.  Urine dip normal.  Check labs, including psa.  Check bladder US and PVR.   Referral to [facility] urology for additional evaluation.  Pt has fam history of prostate cancer.  He has weight loss, however he is trying to eat better so it is not unexplained.  Follow up pending results.

## 2024-06-05 NOTE — PROGRESS NOTES
Ambulatory Visit  Name: Arnaldo Last      : 1975      MRN: [id number]  Encounter Provider: ROSE Duron  Encounter Date: 2024   Encounter department: [facility]    Assessment & Plan   1. Lower urinary tract symptoms (LUTS)  Assessment & Plan:  Chronic symptoms that seem to be getting worse.  Urine dip normal.  Check labs, including psa.  Check bladder US and PVR.   Referral to [facility] urology for additional evaluation.  Pt has fam history of prostate cancer.  He has weight loss, however he is trying to eat better so it is not unexplained.  Follow up pending results.  Orders:  -     POCT urine dip  -     US kidney and bladder with pvr; Future; Expected date: 2024  -     PSA, Total Screen; Future  -     Comprehensive metabolic panel; Future  -     CBC and differential; Future  -     Ambulatory Referral to Urology; Future  -     UA w Reflex to Microscopic w Reflex to Culture; Future  -     UA w Reflex to Microscopic w Reflex to Culture       History of Present Illness     Pt is a 49 y.o. y/o male who is seen today for evaluation of dysuria.  He has history of frequency for many years, he feels that he is having more burning for the last couple of months.  He says this is worse first thing in the morning and then it improves but the frequency continues.  He does not feel he empties his bladder.  [facility] and was given flomax for enlarged prostate.  He did not feel like that helped him too much.  He says he also has the sensation of pressure in the uro/genital region.        Review of Systems   Constitutional:  Negative for appetite change, chills and fever.   Respiratory:  Negative for shortness of breath.    Cardiovascular:  Negative for chest pain and palpitations.   Gastrointestinal:  Negative for constipation, diarrhea, nausea, rectal pain and vomiting.   Genitourinary:  Positive for difficulty urinating, dysuria,  "enuresis (dripping) and frequency. Negative for hematuria, scrotal swelling and testicular pain.        Nocturia x 2-3   Allergic/Immunologic: Positive for environmental allergies.       Objective     /78 (BP Location: Left arm, Patient Position: Sitting, Cuff Size: Standard)   Pulse 75   Temp (!) 97 °F (36.1 °C) (Tympanic)   Resp 16   Ht 5' 8\" (1.727 m)   Wt 105 kg (232 lb 3.2 oz)   SpO2 96%   BMI 35.31 kg/m²     Physical Exam  Vitals reviewed.   Constitutional:       General: He is awake. He is not in acute distress.     Appearance: Normal appearance. He is well-developed and well-groomed. He is not ill-appearing.   Cardiovascular:      Rate and Rhythm: Normal rate and regular rhythm.      Heart sounds: Normal heart sounds. No murmur heard.  Pulmonary:      Effort: Pulmonary effort is normal.      Breath sounds: Normal breath sounds.   Abdominal:      General: Bowel sounds are normal.      Palpations: Abdomen is soft.      Tenderness: There is no abdominal tenderness. There is no right CVA tenderness or left CVA tenderness.   Skin:     General: Skin is warm and dry.   Neurological:      Mental Status: He is alert and oriented to person, place, and time.   Psychiatric:         Attention and Perception: Attention normal.         Mood and Affect: Mood normal.         Speech: Speech normal.         Behavior: Behavior normal. Behavior is cooperative.         Thought Content: Thought content normal.         Cognition and Memory: Cognition normal.         Judgment: Judgment normal.       Administrative Statements           "

## 2024-06-09 DIAGNOSIS — I10 PRIMARY HYPERTENSION: ICD-10-CM

## 2024-06-09 RX ORDER — AMLODIPINE BESYLATE 2.5 MG/1
2.5 TABLET ORAL DAILY
Qty: 90 TABLET | Refills: 0 | Status: SHIPPED | OUTPATIENT
Start: 2024-06-09

## 2024-07-09 ENCOUNTER — TELEPHONE (OUTPATIENT)
Dept: UROLOGY | Facility: CLINIC | Age: 49
End: 2024-07-09

## 2024-07-09 NOTE — TELEPHONE ENCOUNTER
Reviewed referral. Patient needs appointment for BPH with [facility]. Was trialed on [facility] by [facility] which did not help.    Can be scheduled next available with an AP

## 2024-08-07 DIAGNOSIS — I10 PRIMARY HYPERTENSION: ICD-10-CM

## 2024-08-07 RX ORDER — AMLODIPINE BESYLATE 2.5 MG/1
2.5 TABLET ORAL DAILY
Qty: 100 TABLET | Refills: 1 | Status: SHIPPED | OUTPATIENT
Start: 2024-08-07

## 2024-08-14 DIAGNOSIS — E78.2 MIXED HYPERLIPIDEMIA: ICD-10-CM

## 2024-08-15 RX ORDER — GEMFIBROZIL 600 MG/1
600 TABLET, FILM COATED ORAL 2 TIMES DAILY
Qty: 60 TABLET | Refills: 0 | Status: SHIPPED | OUTPATIENT
Start: 2024-08-15

## 2024-08-15 NOTE — TELEPHONE ENCOUNTER
Patient needs updated blood work and has previously placed orders 06.2024. Please contact patient to go for labs.   30D Courtesy refill provided.

## 2024-09-04 ENCOUNTER — OFFICE VISIT (OUTPATIENT)
Dept: UROLOGY | Facility: CLINIC | Age: 49
End: 2024-09-04
Payer: COMMERCIAL

## 2024-09-04 VITALS
HEART RATE: 68 BPM | SYSTOLIC BLOOD PRESSURE: 130 MMHG | HEIGHT: 68 IN | DIASTOLIC BLOOD PRESSURE: 80 MMHG | BODY MASS INDEX: 34.86 KG/M2 | OXYGEN SATURATION: 95 % | WEIGHT: 230 LBS

## 2024-09-04 DIAGNOSIS — R35.0 URINARY FREQUENCY: Primary | ICD-10-CM

## 2024-09-04 LAB
POST-VOID RESIDUAL VOLUME, ML POC: 49 ML
SL AMB  POCT GLUCOSE, UA: NORMAL
SL AMB LEUKOCYTE ESTERASE,UA: NORMAL
SL AMB POCT BILIRUBIN,UA: NORMAL
SL AMB POCT BLOOD,UA: NORMAL
SL AMB POCT CLARITY,UA: CLEAR
SL AMB POCT COLOR,UA: YELLOW
SL AMB POCT KETONES,UA: NORMAL
SL AMB POCT NITRITE,UA: NORMAL
SL AMB POCT PH,UA: 5
SL AMB POCT SPECIFIC GRAVITY,UA: 1
SL AMB POCT URINE PROTEIN: NORMAL
SL AMB POCT UROBILINOGEN: 0.2

## 2024-09-04 PROCEDURE — 81002 URINALYSIS NONAUTO W/O SCOPE: CPT | Performed by: PHYSICIAN ASSISTANT

## 2024-09-04 PROCEDURE — 99203 OFFICE O/P NEW LOW 30 MIN: CPT | Performed by: PHYSICIAN ASSISTANT

## 2024-09-04 PROCEDURE — 51798 US URINE CAPACITY MEASURE: CPT | Performed by: PHYSICIAN ASSISTANT

## 2024-09-04 NOTE — PROGRESS NOTES
UROLOGY CONSULTATION NOTE     Patient Identifiers: Arnaldo Last (MRN: [id number])  Service Requesting Consultation: ROSE Duron    Service Providing Consultation:  Urology, Kelby Goldstein PA-C  Consults  Date of Service: 9/4/2024    Reason for Consultation: BPH    History of Present Illness:     Arnaldo Last is a 49 y.o. male with no prior urologic history.  He denies any family history of prostate bladder or kidney diseases.  He is referred from primary care for evaluation of urinary frequency.  He reports frequency in a 24-hour period about 10-12 times.  PVR 49 mL.  No burning or hematuria.  He was tried on tamsulosin which he did not feel was quite effective.  He also believes last year he had a small amount of hematospermia.  PSA in 2022 was 1.14.    Past Medical, Past Surgical History:     Past Medical History:   Diagnosis Date    Bronchitis     COVID     GERD (gastroesophageal reflux disease)     Hypertension    :    Past Surgical History:   Procedure Laterality Date    COLONOSCOPY  8/2023    HERNIA REPAIR      WISDOM TOOTH EXTRACTION Bilateral 2002   :    Medications, Allergies:     Current Outpatient Medications:     amLODIPine (NORVASC) 2.5 mg tablet, take 1 tablet by mouth daily, Disp: 100 tablet, Rfl: 1    ammonium lactate (LAC-HYDRIN) 12 % cream, APPLY TO AFFECTED AREA(S) OF THE FEET TWICE DAILY, ESPECIALLY AFTER SHOWERING, Disp: , Rfl:     Cholecalciferol (Vitamin D) 50 MCG (2000 UT) tablet, Take 2 tablets by mouth daily, Disp: , Rfl:     clindamycin (CLEOCIN T) 1 % external solution, APPLY TO AFFECTED AREAS OF FACE AS NEEDED FOR FLARES, Disp: , Rfl:     famotidine (PEPCID) 20 mg tablet, Take 1 tablet (20 mg total) by mouth 2 (two) times a day, Disp: 60 tablet, Rfl: 0    gemfibrozil (LOPID) 600 mg tablet, take 1 tablet by mouth twice a day, Disp: 60 tablet, Rfl: 0    ipratropium (ATROVENT) 0.03 % nasal spray, 2 sprays into each nostril every 12 (twelve) hours, Disp: 30 mL,  "Rfl: 0    minocycline (MINOCIN) 100 mg capsule, Take 100 mg by mouth daily, Disp: , Rfl:     Rinvoq 15 MG TB24, Take 15 mg by mouth in the morning, Disp: , Rfl:     Allergies:  Allergies   Allergen Reactions    Penicillins Rash and Hives   :    Social and Family History:   Social History:   Social History     Tobacco Use    Smoking status: Never     Passive exposure: Past    Smokeless tobacco: Never   Vaping Use    Vaping status: Never Used   Substance Use Topics    Alcohol use: Yes     Alcohol/week: 1.0 standard drink of alcohol     Types: 1 Cans of beer per week    Drug use: Never   .    Social History     Tobacco Use   Smoking Status Never    Passive exposure: Past   Smokeless Tobacco Never       Family History:  Family History   Problem Relation Age of Onset    Diabetes Mother     No Known Problems Father     No Known Problems Brother     Colon cancer Paternal Grandfather     Cancer Paternal Grandfather         Diagnosed at 89 years old    Diabetes Maternal Aunt     Diabetes Maternal Uncle     Diabetes Paternal Aunt     Diabetes Paternal Uncle    :     Review of Systems:     General: Fever, chills, or night sweats: negative  Cardiac: Negative for chest pain.    Pulmonary: Negative for shortness of breath.  Gastrointestinal: Abdominal pain negative.  Nausea, vomiting, or diarrhea negative,  Genitourinary: See HPI above.  Patient does not have hematuria.  All other systems queried were negative.    Physical Exam:   General: Patient is pleasant and in NAD. Awake and alert  /80 (BP Location: Left arm, Patient Position: Sitting, Cuff Size: Adult)   Pulse 68   Ht 5' 8\" (1.727 m)   Wt 104 kg (230 lb)   SpO2 95%   BMI 34.97 kg/m²   HEENT:  Conjunctiva are clear  Constitutional:  pleasant and cooperative     no apparent distress  Cardiac: Peripheral edema: negative  Pulmonary: Non-labored breathing  Abdomen: Soft, non-tender, non-distended.  No surgical scars.  No masses, tenderness, hernias noted.  "   Genitourinary: Negative CVA tenderness, negative suprapubic tenderness.  Extremities:  Moves all extremities  Neurological:CNII-XII intact. No numbness or tingling. Essentially non focal neurologic exam  Psychiatric:mood affect and behavior normal    (Male): Penis circumcised, phallus normal, meatus patent.  Testicles descended into scrotum bilaterally without masses nor tenderness.  No inguinal hernias bilaterally.  GABY: Prostate is enlarged at 40 grams. The prostate is not boggy. The prostate is not tender.  No nodules noted.      Labs:     Lab Results   Component Value Date    HGB 14.8 02/16/2024    HCT 42.3 02/16/2024    WBC 5.83 02/16/2024     02/16/2024   ]    Lab Results   Component Value Date    K 4.0 02/16/2024     02/16/2024    CO2 25 02/16/2024    BUN 15 02/16/2024    CREATININE 0.82 02/16/2024    CALCIUM 9.0 02/16/2024   ]    Imaging:   I personally reviewed the images and report of the following studies, and reviewed them with the patient:  none     ASSESSMENT:     #1.  BPH    PLAN:   -Will give him a trial of alfuzosin  -Follow-up in 6 months and recheck PVR  -Consider cystoscopy and TRUS  -Manage fluid intake which is likely a contributing factor      Thank you for allowing me to participate in this patients’ care.  Please do not hesitate to call with any additional questions.  Kelby Goldstein PA-C

## 2024-09-10 ENCOUNTER — TELEPHONE (OUTPATIENT)
Age: 49
End: 2024-09-10

## 2024-09-10 DIAGNOSIS — R35.0 URINARY FREQUENCY: Primary | ICD-10-CM

## 2024-09-10 RX ORDER — ALFUZOSIN HYDROCHLORIDE 10 MG/1
10 TABLET, EXTENDED RELEASE ORAL DAILY
Qty: 30 TABLET | Refills: 3 | Status: SHIPPED | OUTPATIENT
Start: 2024-09-10

## 2024-09-10 NOTE — TELEPHONE ENCOUNTER
Left a voicemail per communication sheet to inform patient that per the CRNP a prescription for Alfusozin was sent to the pharmacy. Our office phone number was given to the patient.

## 2024-09-10 NOTE — TELEPHONE ENCOUNTER
Patient calling in regards to per OV notes a trial of Alfuzosin being sent to his pharmacy and he has not received that    Please forward to his [facility] pharmacy on file

## 2024-09-18 ENCOUNTER — TELEPHONE (OUTPATIENT)
Dept: FAMILY MEDICINE CLINIC | Facility: CLINIC | Age: 49
End: 2024-09-18

## 2024-09-18 NOTE — TELEPHONE ENCOUNTER
9/18 LM for pt apt needed to be moved or r/s as it went into a meeting, moved patient down to 230pm same day or he can r/s if that doesn't work

## 2024-09-26 ENCOUNTER — APPOINTMENT (OUTPATIENT)
Dept: LAB | Facility: CLINIC | Age: 49
End: 2024-09-26
Payer: COMMERCIAL

## 2024-09-26 DIAGNOSIS — E78.2 MIXED HYPERLIPIDEMIA: ICD-10-CM

## 2024-09-26 DIAGNOSIS — Z13.1 SCREENING FOR DIABETES MELLITUS (DM): ICD-10-CM

## 2024-09-26 DIAGNOSIS — R39.9 LOWER URINARY TRACT SYMPTOMS (LUTS): ICD-10-CM

## 2024-09-26 LAB
ALBUMIN SERPL BCG-MCNC: 4.2 G/DL (ref 3.5–5)
ALP SERPL-CCNC: 59 U/L (ref 34–104)
ALT SERPL W P-5'-P-CCNC: 66 U/L (ref 7–52)
ANION GAP SERPL CALCULATED.3IONS-SCNC: 8 MMOL/L (ref 4–13)
AST SERPL W P-5'-P-CCNC: 30 U/L (ref 13–39)
BASOPHILS # BLD AUTO: 0.02 THOUSANDS/ΜL (ref 0–0.1)
BASOPHILS NFR BLD AUTO: 0 % (ref 0–1)
BILIRUB SERPL-MCNC: 0.48 MG/DL (ref 0.2–1)
BUN SERPL-MCNC: 12 MG/DL (ref 5–25)
CALCIUM SERPL-MCNC: 9.2 MG/DL (ref 8.4–10.2)
CHLORIDE SERPL-SCNC: 103 MMOL/L (ref 96–108)
CHOLEST SERPL-MCNC: 212 MG/DL
CO2 SERPL-SCNC: 28 MMOL/L (ref 21–32)
CREAT SERPL-MCNC: 0.81 MG/DL (ref 0.6–1.3)
EOSINOPHIL # BLD AUTO: 0.08 THOUSAND/ΜL (ref 0–0.61)
EOSINOPHIL NFR BLD AUTO: 2 % (ref 0–6)
ERYTHROCYTE [DISTWIDTH] IN BLOOD BY AUTOMATED COUNT: 12.4 % (ref 11.6–15.1)
EST. AVERAGE GLUCOSE BLD GHB EST-MCNC: 108 MG/DL
GFR SERPL CREATININE-BSD FRML MDRD: 105 ML/MIN/1.73SQ M
GLUCOSE P FAST SERPL-MCNC: 100 MG/DL (ref 65–99)
HBA1C MFR BLD: 5.4 %
HCT VFR BLD AUTO: 41.6 % (ref 36.5–49.3)
HDLC SERPL-MCNC: 47 MG/DL
HGB BLD-MCNC: 14.4 G/DL (ref 12–17)
IMM GRANULOCYTES # BLD AUTO: 0.05 THOUSAND/UL (ref 0–0.2)
IMM GRANULOCYTES NFR BLD AUTO: 1 % (ref 0–2)
LDLC SERPL CALC-MCNC: 109 MG/DL (ref 0–100)
LYMPHOCYTES # BLD AUTO: 2.14 THOUSANDS/ΜL (ref 0.6–4.47)
LYMPHOCYTES NFR BLD AUTO: 39 % (ref 14–44)
MCH RBC QN AUTO: 31 PG (ref 26.8–34.3)
MCHC RBC AUTO-ENTMCNC: 34.6 G/DL (ref 31.4–37.4)
MCV RBC AUTO: 90 FL (ref 82–98)
MONOCYTES # BLD AUTO: 0.51 THOUSAND/ΜL (ref 0.17–1.22)
MONOCYTES NFR BLD AUTO: 9 % (ref 4–12)
NEUTROPHILS # BLD AUTO: 2.68 THOUSANDS/ΜL (ref 1.85–7.62)
NEUTS SEG NFR BLD AUTO: 49 % (ref 43–75)
NONHDLC SERPL-MCNC: 165 MG/DL
NRBC BLD AUTO-RTO: 0 /100 WBCS
PLATELET # BLD AUTO: 260 THOUSANDS/UL (ref 149–390)
PMV BLD AUTO: 9.6 FL (ref 8.9–12.7)
POTASSIUM SERPL-SCNC: 3.9 MMOL/L (ref 3.5–5.3)
PROT SERPL-MCNC: 7.4 G/DL (ref 6.4–8.4)
PSA SERPL-MCNC: 1.07 NG/ML (ref 0–4)
RBC # BLD AUTO: 4.65 MILLION/UL (ref 3.88–5.62)
SODIUM SERPL-SCNC: 139 MMOL/L (ref 135–147)
TRIGL SERPL-MCNC: 281 MG/DL
WBC # BLD AUTO: 5.48 THOUSAND/UL (ref 4.31–10.16)

## 2024-09-26 PROCEDURE — G0103 PSA SCREENING: HCPCS

## 2024-09-26 PROCEDURE — 85025 COMPLETE CBC W/AUTO DIFF WBC: CPT

## 2024-09-26 PROCEDURE — 80053 COMPREHEN METABOLIC PANEL: CPT

## 2024-09-26 PROCEDURE — 83036 HEMOGLOBIN GLYCOSYLATED A1C: CPT

## 2024-09-26 PROCEDURE — 36415 COLL VENOUS BLD VENIPUNCTURE: CPT

## 2024-09-26 PROCEDURE — 80061 LIPID PANEL: CPT

## 2024-09-30 ENCOUNTER — OFFICE VISIT (OUTPATIENT)
Dept: FAMILY MEDICINE CLINIC | Facility: CLINIC | Age: 49
End: 2024-09-30
Payer: COMMERCIAL

## 2024-09-30 VITALS
BODY MASS INDEX: 35.28 KG/M2 | DIASTOLIC BLOOD PRESSURE: 80 MMHG | OXYGEN SATURATION: 97 % | HEIGHT: 68 IN | RESPIRATION RATE: 16 BRPM | HEART RATE: 93 BPM | WEIGHT: 232.8 LBS | SYSTOLIC BLOOD PRESSURE: 138 MMHG | TEMPERATURE: 97.7 F

## 2024-09-30 DIAGNOSIS — Z23 ENCOUNTER FOR IMMUNIZATION: ICD-10-CM

## 2024-09-30 DIAGNOSIS — E78.2 MIXED HYPERLIPIDEMIA: ICD-10-CM

## 2024-09-30 DIAGNOSIS — R74.01 ELEVATED AST (SGOT): ICD-10-CM

## 2024-09-30 DIAGNOSIS — Z00.00 ANNUAL PHYSICAL EXAM: Primary | ICD-10-CM

## 2024-09-30 PROCEDURE — 90673 RIV3 VACCINE NO PRESERV IM: CPT

## 2024-09-30 PROCEDURE — 90471 IMMUNIZATION ADMIN: CPT

## 2024-09-30 PROCEDURE — 99396 PREV VISIT EST AGE 40-64: CPT | Performed by: NURSE PRACTITIONER

## 2024-09-30 NOTE — PATIENT INSTRUCTIONS
"Patient Education     Routine physical for adults   The Basics   Written by the doctors and editors at [facility]   What is a physical? -- A physical is a routine visit, or \"check-up,\" with your doctor. You might also hear it called a \"wellness visit\" or \"preventive visit.\"  During each visit, the doctor will:   Ask about your physical and mental health   Ask about your habits, behaviors, and lifestyle   Do an exam   Give you vaccines if needed   Talk to you about any medicines you take   Give advice about your health   Answer your questions  Getting regular check-ups is an important part of taking care of your health. It can help your doctor find and treat any problems you have. But it's also important for preventing health problems.  A routine physical is different from a \"sick visit.\" A sick visit is when you see a doctor because of a health concern or problem. Since physicals are scheduled ahead of time, you can think about what you want to ask the doctor.  How often should I get a physical? -- It depends on your age and health. In general, for people age 21 years and older:   If you are younger than 50 years, you might be able to get a physical every 3 years.   If you are 50 years or older, your doctor might recommend a physical every year.  If you have an ongoing health condition, like diabetes or high blood pressure, your doctor will probably want to see you more often.  What happens during a physical? -- In general, each visit will include:   Physical exam - The doctor or nurse will check your height, weight, heart rate, and blood pressure. They will also look at your eyes and ears. They will ask about how you are feeling and whether you have any symptoms that bother you.   Medicines - It's a good idea to bring a list of all the medicines you take to each doctor visit. Your doctor will talk to you about your medicines and answer any questions. Tell them if you are having any side effects that bother you. You " "should also tell them if you are having trouble paying for any of your medicines.   Habits and behaviors - This includes:   Your diet   Your exercise habits   Whether you smoke, drink alcohol, or use drugs   Whether you are sexually active   Whether you feel safe at home  Your doctor will talk to you about things you can do to improve your health and lower your risk of health problems. They will also offer help and support. For example, if you want to quit smoking, they can give you advice and might prescribe medicines. If you want to improve your diet or get more physical activity, they can help you with this, too.   Lab tests, if needed - The tests you get will depend on your age and situation. For example, your doctor might want to check your:   Cholesterol   Blood sugar   Iron level   Vaccines - The recommended vaccines will depend on your age, health, and what vaccines you already had. Vaccines are very important because they can prevent certain serious or deadly infections.   Discussion of screening - \"Screening\" means checking for diseases or other health problems before they cause symptoms. Your doctor can recommend screening based on your age, risk, and preferences. This might include tests to check for:   Cancer, such as breast, prostate, cervical, ovarian, colorectal, prostate, lung, or skin cancer   Sexually transmitted infections, such as chlamydia and gonorrhea   Mental health conditions like depression and anxiety  Your doctor will talk to you about the different types of screening tests. They can help you decide which screenings to have. They can also explain what the results might mean.   Answering questions - The physical is a good time to ask the doctor or nurse questions about your health. If needed, they can refer you to other doctors or specialists, too.  Adults older than 65 years often need other care, too. As you get older, your doctor will talk to you about:   How to prevent falling at " home   Hearing or vision tests   Memory testing   How to take your medicines safely   Making sure that you have the help and support you need at home  All topics are updated as new evidence becomes available and our peer review process is complete.  This topic retrieved from [employer] on: May 02, 2024.  Topic 271368 Version 1.0  Release: 32.4.3 - C32.122  © 2024 UpToDate, Inc. and/or its affiliates. All rights reserved.  Consumer Information Use and Disclaimer   Disclaimer: This generalized information is a limited summary of diagnosis, treatment, and/or medication information. It is not meant to be comprehensive and should be used as a tool to help the user understand and/or assess potential diagnostic and treatment options. It does NOT include all information about conditions, treatments, medications, side effects, or risks that may apply to a specific patient. It is not intended to be medical advice or a substitute for the medical advice, diagnosis, or treatment of a health care provider based on the health care provider's examination and assessment of a patient's specific and unique circumstances. Patients must speak with a health care provider for complete information about their health, medical questions, and treatment options, including any risks or benefits regarding use of medications. This information does not endorse any treatments or medications as safe, effective, or approved for treating a specific patient. UpToDate, Inc. and its affiliates disclaim any warranty or liability relating to this information or the use thereof.The use of this information is governed by the Terms of Use, available at https://www.woltersGun.iouwer.com/en/know/clinical-effectiveness-terms. 2024© UpToDate, Inc. and its affiliates and/or licensors. All rights reserved.  Copyright   © 2024 UpToDate, Inc. and/or its affiliates. All rights reserved.

## 2024-09-30 NOTE — ASSESSMENT & PLAN NOTE
Reviewed labs.  Total and ldl are stable and triglyceride has decreased but still elevated.  Reviewed diet and encourage he eliminate soda and increase exercise.  Continue gemfibrozil.  Repeat lipid panel in 6 months  Orders:    Lipid panel; Future

## 2024-09-30 NOTE — PROGRESS NOTES
Adult Annual Physical  Name: Arnaldo Last      : 1975      MRN: [id number]  Encounter Provider: ROSE Duron  Encounter Date: 2024   Encounter department: [facility]    Assessment & Plan  Annual physical exam  Unremarkable exam of adult male.  Flu vaccine administered.  Reviewed his recent labs with him: lipid panel, TSH, psa, cmp, A1c.  Up to date with dental and eye exams.  Encourage healthy diet and regular exercise.       Mixed hyperlipidemia  Reviewed labs.  Total and ldl are stable and triglyceride has decreased but still elevated.  Reviewed diet and encourage he eliminate soda and increase exercise.  Continue gemfibrozil.  Repeat lipid panel in 6 months  Orders:    Lipid panel; Future    Elevated AST (SGOT)  All other lft normal.  Check hep function panel in 1 month.  Orders:    Hepatic function panel; Future    Encounter for immunization    Orders:    influenza vaccine, recombinant, PF, 0.5 mL IM (Flublok)    Immunizations and preventive care screenings were discussed with patient today. Appropriate education was printed on patient's after visit summary.    Discussed risks and benefits of prostate cancer screening. We discussed the controversial history of PSA screening for prostate cancer in the [place] as well as the risk of over detection and over treatment of prostate cancer by way of PSA screening.  The patient understands that PSA blood testing is an imperfect way to screen for prostate cancer and that elevated PSA levels in the blood may also be caused by infection, inflammation, prostatic trauma or manipulation, urological procedures, or by benign prostatic enlargement.    The role of the digital rectal examination in prostate cancer screening was also discussed and I discussed with him that there is large interobserver variability in the findings of digital rectal examination.    Counseling:  Dental Health: discussed importance of regular tooth  "brushing, flossing, and dental visits.  Injury prevention: discussed safety/seat belts, safety helmets, smoke detectors, carbon monoxide detectors, and smoking near bedding or upholstery.  Exercise: the importance of regular exercise/physical activity was discussed. Recommend exercise 3-5 times per week for at least 30 minutes.          History of Present Illness     Adult Annual Physical:  Patient presents for annual physical.     Diet and Physical Activity:  - Diet/Nutrition: frequent junk food. portions are smaller but still eats a lot of take out  - Exercise: no formal exercise.    Depression Screening:  - PHQ-2 Score: 0    General Health:  - Sleep: 7-8 hours of sleep on average.  - Hearing: normal hearing bilateral ears.  - Vision: no vision problems and most recent eye exam < 1 year ago. wears glasses for driving  - Dental: regular dental visits.     Health:    - Urinary symptoms: nocturia and incomplete bladder emptying.     Review of Systems   Constitutional:  Negative for activity change, appetite change, chills, fatigue, fever and unexpected weight change.   HENT:  Negative for hearing loss.    Eyes:  Negative for visual disturbance.   Respiratory:  Negative for chest tightness and shortness of breath.    Cardiovascular:  Negative for chest pain, palpitations and leg swelling.   Gastrointestinal:  Negative for abdominal pain, constipation, diarrhea, nausea and vomiting.   Genitourinary:  Positive for frequency. Negative for difficulty urinating and dysuria.   Musculoskeletal:  Negative for arthralgias and myalgias.   Allergic/Immunologic: Negative for environmental allergies.   Neurological:  Negative for dizziness, weakness, light-headedness, numbness and headaches.   Psychiatric/Behavioral:  Negative for dysphoric mood and sleep disturbance. The patient is not nervous/anxious.          Objective     /80   Pulse 93   Temp 97.7 °F (36.5 °C) (Tympanic)   Resp 16   Ht 5' 7.5\" (1.715 m)   Wt 106 " kg (232 lb 12.8 oz)   SpO2 97%   BMI 35.92 kg/m²     Physical Exam  Vitals reviewed.   Constitutional:       General: He is awake. He is not in acute distress.     Appearance: Normal appearance. He is well-developed and well-groomed. He is not ill-appearing.   HENT:      Head: Normocephalic and atraumatic.      Right Ear: Hearing, tympanic membrane, ear canal and external ear normal.      Left Ear: Hearing, tympanic membrane, ear canal and external ear normal.      Nose: Nose normal.      Mouth/Throat:      Lips: Pink.      Mouth: Mucous membranes are moist.      Pharynx: Oropharynx is clear.   Eyes:      General: Lids are normal.      Conjunctiva/sclera: Conjunctivae normal.      Pupils: Pupils are equal, round, and reactive to light.   Neck:      Thyroid: No thyroid mass or thyromegaly.      Vascular: Normal carotid pulses. No carotid bruit or JVD.   Cardiovascular:      Rate and Rhythm: Normal rate and regular rhythm.      Pulses: Normal pulses.      Heart sounds: Normal heart sounds. No murmur heard.  Pulmonary:      Effort: Pulmonary effort is normal.      Breath sounds: Normal breath sounds.   Abdominal:      General: Abdomen is flat. Bowel sounds are normal.      Palpations: Abdomen is soft.      Tenderness: There is no abdominal tenderness.   Musculoskeletal:         General: Normal range of motion.      Cervical back: Normal range of motion.      Right lower leg: No edema.      Left lower leg: No edema.   Lymphadenopathy:      Cervical: No cervical adenopathy.   Skin:     General: Skin is warm and dry.      Capillary Refill: Capillary refill takes less than 2 seconds.   Neurological:      Mental Status: He is alert and oriented to person, place, and time.      Motor: Motor function is intact.   Psychiatric:         Attention and Perception: Attention normal.         Mood and Affect: Mood normal.         Speech: Speech normal.         Behavior: Behavior normal. Behavior is cooperative.         Thought  Content: Thought content normal.         Cognition and Memory: Cognition normal.         Judgment: Judgment normal.

## 2024-09-30 NOTE — ASSESSMENT & PLAN NOTE
Unremarkable exam of adult male.  Flu vaccine administered.  Reviewed his recent labs with him: lipid panel, TSH, psa, cmp, A1c.  Up to date with dental and eye exams.  Encourage healthy diet and regular exercise.

## 2024-10-09 DIAGNOSIS — R35.0 URINARY FREQUENCY: ICD-10-CM

## 2024-10-10 RX ORDER — ALFUZOSIN HYDROCHLORIDE 10 MG/1
10 TABLET, EXTENDED RELEASE ORAL DAILY
Qty: 30 TABLET | Refills: 0 | Status: SHIPPED | OUTPATIENT
Start: 2024-10-10

## 2024-11-05 DIAGNOSIS — R35.0 URINARY FREQUENCY: ICD-10-CM

## 2024-11-05 RX ORDER — ALFUZOSIN HYDROCHLORIDE 10 MG/1
10 TABLET, EXTENDED RELEASE ORAL DAILY
Qty: 30 TABLET | Refills: 5 | Status: SHIPPED | OUTPATIENT
Start: 2024-11-05 | End: 2024-11-07 | Stop reason: SDUPTHER

## 2024-11-07 DIAGNOSIS — R35.0 URINARY FREQUENCY: ICD-10-CM

## 2024-11-08 RX ORDER — ALFUZOSIN HYDROCHLORIDE 10 MG/1
10 TABLET, EXTENDED RELEASE ORAL DAILY
Qty: 90 TABLET | Refills: 1 | Status: SHIPPED | OUTPATIENT
Start: 2024-11-08

## 2024-11-09 DIAGNOSIS — Z00.6 ENCOUNTER FOR EXAMINATION FOR NORMAL COMPARISON OR CONTROL IN CLINICAL RESEARCH PROGRAM: ICD-10-CM

## 2025-01-26 DIAGNOSIS — R35.0 URINARY FREQUENCY: ICD-10-CM

## 2025-01-26 DIAGNOSIS — I10 PRIMARY HYPERTENSION: ICD-10-CM

## 2025-01-26 RX ORDER — AMLODIPINE BESYLATE 2.5 MG/1
2.5 TABLET ORAL DAILY
Qty: 100 TABLET | Refills: 1 | Status: SHIPPED | OUTPATIENT
Start: 2025-01-26

## 2025-01-27 RX ORDER — ALFUZOSIN HYDROCHLORIDE 10 MG/1
10 TABLET, EXTENDED RELEASE ORAL DAILY
Qty: 90 TABLET | Refills: 0 | OUTPATIENT
Start: 2025-01-27

## 2025-01-29 DIAGNOSIS — I10 PRIMARY HYPERTENSION: ICD-10-CM

## 2025-01-30 RX ORDER — AMLODIPINE BESYLATE 2.5 MG/1
2.5 TABLET ORAL DAILY
Qty: 100 TABLET | Refills: 0 | OUTPATIENT
Start: 2025-01-30

## 2025-03-13 ENCOUNTER — OFFICE VISIT (OUTPATIENT)
Dept: UROLOGY | Facility: CLINIC | Age: 50
End: 2025-03-13
Payer: COMMERCIAL

## 2025-03-13 VITALS
HEART RATE: 73 BPM | SYSTOLIC BLOOD PRESSURE: 140 MMHG | DIASTOLIC BLOOD PRESSURE: 90 MMHG | OXYGEN SATURATION: 95 % | WEIGHT: 234 LBS | BODY MASS INDEX: 35.46 KG/M2 | HEIGHT: 68 IN

## 2025-03-13 DIAGNOSIS — R35.0 URINARY FREQUENCY: Primary | ICD-10-CM

## 2025-03-13 DIAGNOSIS — R35.0 BENIGN PROSTATIC HYPERPLASIA WITH URINARY FREQUENCY: ICD-10-CM

## 2025-03-13 DIAGNOSIS — N40.1 BENIGN PROSTATIC HYPERPLASIA WITH URINARY FREQUENCY: ICD-10-CM

## 2025-03-13 LAB — POST-VOID RESIDUAL VOLUME, ML POC: 15 ML

## 2025-03-13 PROCEDURE — 99213 OFFICE O/P EST LOW 20 MIN: CPT | Performed by: PHYSICIAN ASSISTANT

## 2025-03-13 PROCEDURE — 51798 US URINE CAPACITY MEASURE: CPT | Performed by: PHYSICIAN ASSISTANT

## 2025-03-13 RX ORDER — CLINDAMYCIN PHOSPHATE 10 MG/G
GEL TOPICAL
COMMUNITY
Start: 2025-01-29

## 2025-03-13 RX ORDER — ALFUZOSIN HYDROCHLORIDE 10 MG/1
10 TABLET, EXTENDED RELEASE ORAL DAILY
Qty: 90 TABLET | Refills: 3 | Status: SHIPPED | OUTPATIENT
Start: 2025-03-13

## 2025-03-13 NOTE — PROGRESS NOTES
UROLOGY PROGRESS NOTE   Patient Identifiers: Arnaldo Last (MRN [id number])  Date of Service: 3/13/2025    Subjective:   49-year-old man who is complaining of urinary frequency.  He was voiding 10-12 times per 24 hours.  I put him on alfuzosin and reports good improvement.  No side effects to medication.  PVR 15 mL.    Reason for visit: BPH and frequency follow-up    Objective:     VITALS:    Vitals:    03/13/25 1127   BP: 140/90   Pulse: 73   SpO2: 95%     AUA SYMPTOM SCORE      Flowsheet Row Most Recent Value   AUA SYMPTOM SCORE    How often have you had a sensation of not emptying your bladder completely after you finished urinating? 5 (P)     How often have you had to urinate again less than two hours after you finished urinating? 5 (P)     How often have you found you stopped and started again several times when you urinate? 2 (P)     How often have you found it difficult to postpone urination? 0 (P)     How often have you had a weak urinary stream? 2 (P)     How often have you had to push or strain to begin urination? 0 (P)     How many times did you most typically get up to urinate from the time you went to bed at night until the time you got up in the morning? 5 (P)     Quality of Life: If you were to spend the rest of your life with your urinary condition just the way it is now, how would you feel about that? 4 (P)     AUA SYMPTOM SCORE 19 (P)                LABS:  Lab Results   Component Value Date    HGB 14.4 09/26/2024    HCT 41.6 09/26/2024    WBC 5.48 09/26/2024     09/26/2024   ]    Lab Results   Component Value Date    K 3.9 09/26/2024     09/26/2024    CO2 28 09/26/2024    BUN 12 09/26/2024    CREATININE 0.81 09/26/2024    CALCIUM 9.2 09/26/2024   ]        INPATIENT MEDS:    Current Outpatient Medications:     alfuzosin (UROXATRAL) 10 mg 24 hr tablet, Take 1 tablet (10 mg total) by mouth daily, Disp: 90 tablet, Rfl: 3    amLODIPine (NORVASC) 2.5 mg tablet, Take 1 tablet (2.5 mg total)  "by mouth daily, Disp: 100 tablet, Rfl: 1    ammonium lactate (LAC-HYDRIN) 12 % cream, APPLY TO AFFECTED AREA(S) OF THE FEET TWICE DAILY, ESPECIALLY AFTER SHOWERING, Disp: , Rfl:     clindamycin 1 % gel, APPLY TOPICALLY TO AFFECTED AREAS BETWEEN TOES twice a day, Disp: , Rfl:     gemfibrozil (LOPID) 600 mg tablet, take 1 tablet by mouth twice a day, Disp: 60 tablet, Rfl: 0    Rinvoq 15 MG TB24, Take 15 mg by mouth in the morning, Disp: , Rfl:     Cholecalciferol (Vitamin D) 50 MCG (2000 UT) tablet, Take 2 tablets by mouth daily (Patient not taking: Reported on 3/13/2025), Disp: , Rfl:     ipratropium (ATROVENT) 0.03 % nasal spray, 2 sprays into each nostril every 12 (twelve) hours (Patient not taking: Reported on 3/13/2025), Disp: 30 mL, Rfl: 0      Physical Exam:   /90 (BP Location: Left arm, Patient Position: Sitting, Cuff Size: Large)   Pulse 73   Ht 5' 7.5\" (1.715 m)   Wt 106 kg (234 lb)   SpO2 95%   BMI 36.11 kg/m²   GEN: no acute distress    RESP: breathing comfortably with no accessory muscle use    ABD: soft, non-tender, non-distended   INCISION:    EXT: no significant peripheral edema     RADIOLOGY:   None    Assessment:   #1.  BPH    Plan:   -Follow-up in 1 year with PSA prior to visit  -  -  -          "

## 2025-03-17 ENCOUNTER — RESULTS FOLLOW-UP (OUTPATIENT)
Dept: FAMILY MEDICINE CLINIC | Facility: CLINIC | Age: 50
End: 2025-03-17

## 2025-03-17 ENCOUNTER — APPOINTMENT (OUTPATIENT)
Dept: LAB | Facility: CLINIC | Age: 50
End: 2025-03-17
Payer: COMMERCIAL

## 2025-03-17 DIAGNOSIS — E78.2 MIXED HYPERLIPIDEMIA: ICD-10-CM

## 2025-03-17 DIAGNOSIS — N40.1 BENIGN PROSTATIC HYPERPLASIA WITH URINARY FREQUENCY: ICD-10-CM

## 2025-03-17 DIAGNOSIS — R35.0 BENIGN PROSTATIC HYPERPLASIA WITH URINARY FREQUENCY: ICD-10-CM

## 2025-03-17 DIAGNOSIS — R74.01 ELEVATED AST (SGOT): ICD-10-CM

## 2025-03-17 DIAGNOSIS — Z00.6 ENCOUNTER FOR EXAMINATION FOR NORMAL COMPARISON OR CONTROL IN CLINICAL RESEARCH PROGRAM: ICD-10-CM

## 2025-03-17 LAB
ALBUMIN SERPL BCG-MCNC: 4.5 G/DL (ref 3.5–5)
ALP SERPL-CCNC: 58 U/L (ref 34–104)
ALT SERPL W P-5'-P-CCNC: 60 U/L (ref 7–52)
AST SERPL W P-5'-P-CCNC: 27 U/L (ref 13–39)
BILIRUB DIRECT SERPL-MCNC: 0.05 MG/DL (ref 0–0.2)
BILIRUB SERPL-MCNC: 0.46 MG/DL (ref 0.2–1)
CHOLEST SERPL-MCNC: 190 MG/DL (ref ?–200)
HDLC SERPL-MCNC: 43 MG/DL
LDLC SERPL CALC-MCNC: 108 MG/DL (ref 0–100)
NONHDLC SERPL-MCNC: 147 MG/DL
PROT SERPL-MCNC: 7.4 G/DL (ref 6.4–8.4)
PSA SERPL-MCNC: 1.22 NG/ML (ref 0–4)
TRIGL SERPL-MCNC: 196 MG/DL (ref ?–150)

## 2025-03-17 PROCEDURE — 80076 HEPATIC FUNCTION PANEL: CPT

## 2025-03-17 PROCEDURE — 80061 LIPID PANEL: CPT

## 2025-03-17 PROCEDURE — 84153 ASSAY OF PSA TOTAL: CPT

## 2025-03-17 PROCEDURE — 36415 COLL VENOUS BLD VENIPUNCTURE: CPT

## 2025-03-19 ENCOUNTER — APPOINTMENT (OUTPATIENT)
Dept: LAB | Facility: CLINIC | Age: 50
End: 2025-03-19
Payer: COMMERCIAL

## 2025-03-19 DIAGNOSIS — L20.89 FLEXURAL ATOPIC DERMATITIS: ICD-10-CM

## 2025-03-19 LAB
ANION GAP SERPL CALCULATED.3IONS-SCNC: 7 MMOL/L (ref 4–13)
BASOPHILS # BLD AUTO: 0.03 THOUSANDS/ÂΜL (ref 0–0.1)
BASOPHILS NFR BLD AUTO: 1 % (ref 0–1)
BUN SERPL-MCNC: 13 MG/DL (ref 5–25)
CALCIUM SERPL-MCNC: 9.7 MG/DL (ref 8.4–10.2)
CHLORIDE SERPL-SCNC: 105 MMOL/L (ref 96–108)
CO2 SERPL-SCNC: 27 MMOL/L (ref 21–32)
CREAT SERPL-MCNC: 0.84 MG/DL (ref 0.6–1.3)
EOSINOPHIL # BLD AUTO: 0.08 THOUSAND/ÂΜL (ref 0–0.61)
EOSINOPHIL NFR BLD AUTO: 2 % (ref 0–6)
ERYTHROCYTE [DISTWIDTH] IN BLOOD BY AUTOMATED COUNT: 12.2 % (ref 11.6–15.1)
GFR SERPL CREATININE-BSD FRML MDRD: 104 ML/MIN/1.73SQ M
GLUCOSE P FAST SERPL-MCNC: 104 MG/DL (ref 65–99)
HCT VFR BLD AUTO: 42.6 % (ref 36.5–49.3)
HGB BLD-MCNC: 14.5 G/DL (ref 12–17)
IMM GRANULOCYTES # BLD AUTO: 0.05 THOUSAND/UL (ref 0–0.2)
IMM GRANULOCYTES NFR BLD AUTO: 1 % (ref 0–2)
LYMPHOCYTES # BLD AUTO: 2.3 THOUSANDS/ÂΜL (ref 0.6–4.47)
LYMPHOCYTES NFR BLD AUTO: 44 % (ref 14–44)
MCH RBC QN AUTO: 30.7 PG (ref 26.8–34.3)
MCHC RBC AUTO-ENTMCNC: 34 G/DL (ref 31.4–37.4)
MCV RBC AUTO: 90 FL (ref 82–98)
MONOCYTES # BLD AUTO: 0.48 THOUSAND/ÂΜL (ref 0.17–1.22)
MONOCYTES NFR BLD AUTO: 9 % (ref 4–12)
NEUTROPHILS # BLD AUTO: 2.3 THOUSANDS/ÂΜL (ref 1.85–7.62)
NEUTS SEG NFR BLD AUTO: 43 % (ref 43–75)
NRBC BLD AUTO-RTO: 0 /100 WBCS
PLATELET # BLD AUTO: 269 THOUSANDS/UL (ref 149–390)
PMV BLD AUTO: 9.9 FL (ref 8.9–12.7)
POTASSIUM SERPL-SCNC: 4.3 MMOL/L (ref 3.5–5.3)
RBC # BLD AUTO: 4.72 MILLION/UL (ref 3.88–5.62)
SODIUM SERPL-SCNC: 139 MMOL/L (ref 135–147)
WBC # BLD AUTO: 5.24 THOUSAND/UL (ref 4.31–10.16)

## 2025-03-19 PROCEDURE — 80048 BASIC METABOLIC PNL TOTAL CA: CPT

## 2025-03-19 PROCEDURE — 85025 COMPLETE CBC W/AUTO DIFF WBC: CPT

## 2025-03-19 PROCEDURE — 36415 COLL VENOUS BLD VENIPUNCTURE: CPT

## 2025-03-19 PROCEDURE — 86480 TB TEST CELL IMMUN MEASURE: CPT

## 2025-03-20 LAB
GAMMA INTERFERON BACKGROUND BLD IA-ACNC: 0.02 IU/ML
M TB IFN-G BLD-IMP: NEGATIVE
M TB IFN-G CD4+ BCKGRND COR BLD-ACNC: 0.02 IU/ML
M TB IFN-G CD4+ BCKGRND COR BLD-ACNC: 0.07 IU/ML
MITOGEN IGNF BCKGRD COR BLD-ACNC: 9.63 IU/ML

## 2025-03-28 LAB
APOB+LDLR+PCSK9 GENE MUT ANL BLD/T: NOT DETECTED
BRCA1+BRCA2 DEL+DUP + FULL MUT ANL BLD/T: NOT DETECTED
MLH1+MSH2+MSH6+PMS2 GN DEL+DUP+FUL M: NOT DETECTED

## 2025-05-23 DIAGNOSIS — R35.0 URINARY FREQUENCY: ICD-10-CM

## 2025-05-23 RX ORDER — ALFUZOSIN HYDROCHLORIDE 10 MG/1
10 TABLET, EXTENDED RELEASE ORAL DAILY
Qty: 90 TABLET | Refills: 1 | Status: SHIPPED | OUTPATIENT
Start: 2025-05-23

## 2025-06-10 DIAGNOSIS — I10 PRIMARY HYPERTENSION: ICD-10-CM

## 2025-06-10 DIAGNOSIS — R35.0 URINARY FREQUENCY: ICD-10-CM

## 2025-06-10 RX ORDER — ALFUZOSIN HYDROCHLORIDE 10 MG/1
10 TABLET, EXTENDED RELEASE ORAL DAILY
Qty: 90 TABLET | Refills: 1 | Status: SHIPPED | OUTPATIENT
Start: 2025-06-10

## 2025-06-10 RX ORDER — AMLODIPINE BESYLATE 2.5 MG/1
2.5 TABLET ORAL DAILY
Qty: 100 TABLET | Refills: 0 | Status: SHIPPED | OUTPATIENT
Start: 2025-06-10

## 2025-06-11 DIAGNOSIS — I10 PRIMARY HYPERTENSION: ICD-10-CM

## 2025-06-11 RX ORDER — AMLODIPINE BESYLATE 2.5 MG/1
2.5 TABLET ORAL DAILY
Qty: 90 TABLET | Refills: 1 | OUTPATIENT
Start: 2025-06-11

## 2025-07-23 DIAGNOSIS — R35.0 URINARY FREQUENCY: ICD-10-CM

## 2025-07-23 RX ORDER — ALFUZOSIN HYDROCHLORIDE 10 MG/1
10 TABLET, EXTENDED RELEASE ORAL DAILY
Qty: 90 TABLET | Refills: 1 | Status: SHIPPED | OUTPATIENT
Start: 2025-07-23

## 2025-07-27 DIAGNOSIS — I10 PRIMARY HYPERTENSION: ICD-10-CM

## 2025-07-27 DIAGNOSIS — E78.2 MIXED HYPERLIPIDEMIA: ICD-10-CM

## 2025-07-29 RX ORDER — AMLODIPINE BESYLATE 2.5 MG/1
2.5 TABLET ORAL DAILY
Qty: 100 TABLET | Refills: 0 | Status: SHIPPED | OUTPATIENT
Start: 2025-07-29

## 2025-07-29 RX ORDER — GEMFIBROZIL 600 MG/1
600 TABLET, FILM COATED ORAL 2 TIMES DAILY
Qty: 60 TABLET | Refills: 0 | Status: SHIPPED | OUTPATIENT
Start: 2025-07-29

## 2025-08-19 DIAGNOSIS — R74.01 ELEVATED AST (SGOT): ICD-10-CM

## 2025-08-19 DIAGNOSIS — E78.2 MIXED HYPERLIPIDEMIA: Primary | ICD-10-CM

## 2025-08-19 DIAGNOSIS — R73.01 IFG (IMPAIRED FASTING GLUCOSE): ICD-10-CM

## 2025-08-19 DIAGNOSIS — I10 PRIMARY HYPERTENSION: ICD-10-CM

## 2025-08-21 DIAGNOSIS — E78.2 MIXED HYPERLIPIDEMIA: ICD-10-CM

## 2025-08-22 RX ORDER — GEMFIBROZIL 600 MG/1
600 TABLET, FILM COATED ORAL 2 TIMES DAILY
Qty: 180 TABLET | Refills: 1 | Status: SHIPPED | OUTPATIENT
Start: 2025-08-22